# Patient Record
Sex: MALE | Race: WHITE | Employment: OTHER | URBAN - METROPOLITAN AREA
[De-identification: names, ages, dates, MRNs, and addresses within clinical notes are randomized per-mention and may not be internally consistent; named-entity substitution may affect disease eponyms.]

---

## 2018-03-23 ENCOUNTER — HOSPITAL ENCOUNTER (OUTPATIENT)
Dept: OTHER | Age: 63
Discharge: HOME | End: 2018-03-23

## 2018-03-23 DIAGNOSIS — M41.125 ADOLESCENT IDIOPATHIC SCOLIOSIS OF THORACOLUMBAR REGION: ICD-10-CM

## 2018-04-03 LAB
A/G RATIO: 1.6 (ref 1.1–2.2)
ALBUMIN SERPL-MCNC: 4.5 G/DL (ref 3.4–5)
ALP BLD-CCNC: 50 U/L (ref 40–129)
ALT SERPL-CCNC: 20 U/L (ref 10–40)
ANION GAP SERPL CALCULATED.3IONS-SCNC: 13 MMOL/L (ref 3–16)
AST SERPL-CCNC: 27 U/L (ref 15–37)
BASOPHILS ABSOLUTE: 0 K/UL (ref 0–0.2)
BASOPHILS RELATIVE PERCENT: 0.7 %
BILIRUB SERPL-MCNC: 0.4 MG/DL (ref 0–1)
BUN BLDV-MCNC: 13 MG/DL (ref 7–20)
CALCIUM SERPL-MCNC: 9.1 MG/DL (ref 8.3–10.6)
CHLORIDE BLD-SCNC: 103 MMOL/L (ref 99–110)
CHOLESTEROL, TOTAL: 110 MG/DL (ref 0–199)
CO2: 27 MMOL/L (ref 21–32)
CREAT SERPL-MCNC: 1 MG/DL (ref 0.8–1.3)
EOSINOPHILS ABSOLUTE: 0.1 K/UL (ref 0–0.6)
EOSINOPHILS RELATIVE PERCENT: 2.5 %
FOLATE: >20 NG/ML (ref 4.78–24.2)
GFR AFRICAN AMERICAN: >60
GFR NON-AFRICAN AMERICAN: >60
GLOBULIN: 2.9 G/DL
GLUCOSE BLD-MCNC: 101 MG/DL (ref 70–99)
HCT VFR BLD CALC: 36.3 % (ref 40.5–52.5)
HDLC SERPL-MCNC: 39 MG/DL (ref 40–60)
HEMOGLOBIN: 12.4 G/DL (ref 13.5–17.5)
LDL CHOLESTEROL CALCULATED: 51 MG/DL
LYMPHOCYTES ABSOLUTE: 1.7 K/UL (ref 1–5.1)
LYMPHOCYTES RELATIVE PERCENT: 33.5 %
MCH RBC QN AUTO: 32.1 PG (ref 26–34)
MCHC RBC AUTO-ENTMCNC: 34.1 G/DL (ref 31–36)
MCV RBC AUTO: 94 FL (ref 80–100)
MONOCYTES ABSOLUTE: 0.5 K/UL (ref 0–1.3)
MONOCYTES RELATIVE PERCENT: 9.7 %
NEUTROPHILS ABSOLUTE: 2.6 K/UL (ref 1.7–7.7)
NEUTROPHILS RELATIVE PERCENT: 53.6 %
PDW BLD-RTO: 12.5 % (ref 12.4–15.4)
PLATELET # BLD: 241 K/UL (ref 135–450)
PMV BLD AUTO: 8.8 FL (ref 5–10.5)
POTASSIUM SERPL-SCNC: 4.7 MMOL/L (ref 3.5–5.1)
PROSTATE SPECIFIC ANTIGEN: 0.69 NG/ML (ref 0–4)
RBC # BLD: 3.86 M/UL (ref 4.2–5.9)
SODIUM BLD-SCNC: 143 MMOL/L (ref 136–145)
TOTAL PROTEIN: 7.4 G/DL (ref 6.4–8.2)
TRIGL SERPL-MCNC: 101 MG/DL (ref 0–150)
TSH SERPL DL<=0.05 MIU/L-ACNC: 3.09 UIU/ML (ref 0.27–4.2)
VITAMIN B-12: 766 PG/ML (ref 211–911)
VITAMIN D 25-HYDROXY: 26.2 NG/ML
VLDLC SERPL CALC-MCNC: 20 MG/DL
WBC # BLD: 4.9 K/UL (ref 4–11)

## 2021-02-24 ENCOUNTER — OFFICE VISIT (OUTPATIENT)
Dept: PRIMARY CARE CLINIC | Age: 66
End: 2021-02-24
Payer: MEDICARE

## 2021-02-24 DIAGNOSIS — Z20.828 EXPOSURE TO SARS-ASSOCIATED CORONAVIRUS: Primary | ICD-10-CM

## 2021-02-24 PROCEDURE — 99211 OFF/OP EST MAY X REQ PHY/QHP: CPT | Performed by: NURSE PRACTITIONER

## 2021-02-24 PROCEDURE — G8428 CUR MEDS NOT DOCUMENT: HCPCS | Performed by: NURSE PRACTITIONER

## 2021-02-24 PROCEDURE — G8421 BMI NOT CALCULATED: HCPCS | Performed by: NURSE PRACTITIONER

## 2021-02-24 NOTE — PROGRESS NOTES
Rachel Davidhilario received a viral test for COVID-19. They were educated on isolation and quarantine as appropriate. For any symptoms, they were directed to seek care from their PCP, given contact information to establish with a doctor, directed to an urgent care or the emergency room.

## 2021-02-25 LAB — SARS-COV-2: NOT DETECTED

## 2021-03-02 ENCOUNTER — APPOINTMENT (OUTPATIENT)
Dept: GENERAL RADIOLOGY | Age: 66
End: 2021-03-02
Attending: ANESTHESIOLOGY
Payer: MEDICARE

## 2021-03-02 ENCOUNTER — HOSPITAL ENCOUNTER (OUTPATIENT)
Age: 66
Setting detail: OUTPATIENT SURGERY
Discharge: HOME | End: 2021-03-02
Attending: ANESTHESIOLOGY | Admitting: ANESTHESIOLOGY
Payer: MEDICARE

## 2021-03-02 VITALS
SYSTOLIC BLOOD PRESSURE: 124 MMHG | HEART RATE: 76 BPM | OXYGEN SATURATION: 98 % | BODY MASS INDEX: 28.63 KG/M2 | TEMPERATURE: 98 F | HEIGHT: 70 IN | DIASTOLIC BLOOD PRESSURE: 72 MMHG | WEIGHT: 200 LBS | RESPIRATION RATE: 16 BRPM

## 2021-03-02 PROCEDURE — 2709999900 HC NON-CHARGEABLE SUPPLY: Performed by: ANESTHESIOLOGY

## 2021-03-02 PROCEDURE — 6360000002 HC RX W HCPCS: Performed by: ANESTHESIOLOGY

## 2021-03-02 PROCEDURE — 99152 MOD SED SAME PHYS/QHP 5/>YRS: CPT | Performed by: ANESTHESIOLOGY

## 2021-03-02 PROCEDURE — 77003 FLUOROGUIDE FOR SPINE INJECT: CPT

## 2021-03-02 PROCEDURE — 3610000054 HC PAIN LEVEL 3 BASE (NON-OR): Performed by: ANESTHESIOLOGY

## 2021-03-02 RX ORDER — MIDAZOLAM HYDROCHLORIDE 1 MG/ML
INJECTION INTRAMUSCULAR; INTRAVENOUS
Status: COMPLETED | OUTPATIENT
Start: 2021-03-02 | End: 2021-03-02

## 2021-03-02 RX ORDER — AMLODIPINE BESYLATE 5 MG/1
5 TABLET ORAL DAILY
COMMUNITY

## 2021-03-02 RX ORDER — LEVOTHYROXINE SODIUM 0.1 MG/1
100 TABLET ORAL DAILY
COMMUNITY

## 2021-03-02 RX ORDER — MORPHINE SULFATE 30 MG/1
30 TABLET ORAL 2 TIMES DAILY
COMMUNITY

## 2021-03-02 RX ORDER — METHYLPREDNISOLONE ACETATE 80 MG/ML
INJECTION, SUSPENSION INTRA-ARTICULAR; INTRALESIONAL; INTRAMUSCULAR; SOFT TISSUE
Status: COMPLETED | OUTPATIENT
Start: 2021-03-02 | End: 2021-03-02

## 2021-03-02 RX ORDER — FENTANYL CITRATE 50 UG/ML
INJECTION, SOLUTION INTRAMUSCULAR; INTRAVENOUS
Status: COMPLETED | OUTPATIENT
Start: 2021-03-02 | End: 2021-03-02

## 2021-03-02 ASSESSMENT — PAIN DESCRIPTION - ORIENTATION
ORIENTATION: LOWER
ORIENTATION: LOWER

## 2021-03-02 ASSESSMENT — PAIN DESCRIPTION - LOCATION: LOCATION: BACK

## 2021-03-02 NOTE — PROGRESS NOTES
IV discontinued, catheter intact, and dressing applied. Procedural dressing dry and intact. Bilateral  extremities equal in strength. Discharge instructions reviewed with patient or responsible adult, signed and copy given. All home medications have been reviewed. All questions answered and patient or responsible adult verbalized understanding.   PAIN LEVEL AT DISCHARGE _6____

## 2021-03-03 NOTE — OP NOTE
Operative Note      Patient: Raad Byrne  YOB: 1955  MRN: [id number]    Date of Procedure: 3/2/2021    Pre-Op Diagnosis: RADICULOPATHY LUMBAR REGION (M54.16)    Post-Op Diagnosis: Same       Procedure(s):  L4L5  INTERLAMINAL EPIDURAL STEROID INJECTION WITH FLUOROSCOPY    Surgeon(s):  Daryle Massed, MD    Assistant:   * No surgical staff found *    Anesthesia: IV Sedation    Estimated Blood Loss (mL): Minimal    Complications: None    Specimens:   * No specimens in log *    Implants:  * No implants in log *      Drains: * No LDAs found *    Findings:     Detailed Description of Procedure:   Procedure in Detail:   The patient's chart was reviewed. After obtaining written informed consent, the patient had a 20 g IV placed, and NS was running at 30 ml/hour, the patient was placed in the supine position with the leg slightly flexed. Versed and Fentanyl was given to the patient intravenous, a NC at 4 l was placed and monitors attached. Pre-procedure blood pressure and pulse were stable and recorded in patients clinic chart. MEDICAL NECESSITY: This patient has chronic pain that has failed to respond to conservative measures as outlined in the original history and last physical exam.   The patient's symptoms include Pain and disability of a moderate to severe degree with intermittent refereed pain. The goals of treatment are to 1) achieve optimal pain control, recognizing that a pain-free state may not be achievable; 2) minimize adverse outcomes; 3) enhance functional abilities, and physical and psychological well-being; and 4) enhance the quality of life for patients with chronic pain.   The patient has documented pathology through radiographic imaging, the patient has the same or similar procedure in the past which resulted in significant improvement more than 50% reduction in the pain, as well improvement in their Activity of daily living and quality of life, and this would be the goal for the first time procedure. PROCEDURE:   The patient was placed in the prone position on fluoroscopy table. The lower back was prepped under strict Aseptic Technique with antiseptic solution and draped in the usual sterile fashion. The skin over the L 4- L5 was identified under fluoroscopic guidance and infiltrated with 3 ml of 1% Lidocaine for local anesthesia via 25 gauge needle. An 18-gauge Touhy needle was used under fluoroscopic guidance to access the epidural space, I was not able to get at the same level so I did the L5-S1  using loss of resistance to air technique. Approximately 3 ml of Omnipaque 300 contrast was injected under continuous fluoroscopic guidance and good spread was noted following a negative aspiration  Following negative aspiration, a mixture of 4 ml Preservative free normal Saline and Depomedrol 80 mg = 2 ml was injected with minimal pressure. There was no evidence of CSF, paresthesia or heme. The needle was cleared with preservative free local anesthetic and removed. Skin was cleaned and a sterile dressing was applied. Following the procedure the patient's vital signs were stable. The patient was discharged home in good condition after being given discharge instructions.     Electronically signed by Christinia Collet, MD on 3/3/2021 at 11:25 AM

## 2021-03-03 NOTE — H&P
Update History & Physical    The patient's History and Physical of February 10,  was reviewed with the patient and I examined the patient. There was no change. The surgical site was confirmed by the patient and me. Plan: The risks, benefits, expected outcome, and alternative to the recommended procedure have been discussed with the patient. Patient understands and wants to proceed with the procedure.      Electronically signed by Judd Garcia MD on 3/3/2021 at 11:27 AM

## 2021-07-07 NOTE — PROGRESS NOTES
PATIENT REACHED   YES____NO__X__    PREOP INSTRUCTIONS LEFT ON VM [id number]__[phone]_____________      DATE__7/13/21_______ TIME_1130________ARRIVAL_1030_______PLACE__masc__________  NOTHING TO EAT OR DRINK  AFTER MIDNIGHT THE EVENING PRIOR OR AS INSTRUCTED BY YOUR DR.  Coleen Caldera NEED A RESPONSIBLE ADULT AGE 25 OR OLDER TO DRIVE YOU HOME  PLEASE BRING INSURANCE CARD. PICTURE ID AND COMPLETE LIST OF MEDS  WEAR LOOSE COMFORTABLE CLOTHING  FOLLOW ANY INSTRUCTIONS YOUR DRS OFFICE HAS GIVEN YOU,INCLUDING WHAT MEDICATIONS TO TAKE THE AM OF PROCEDURE AND WHEN AND IF YOU NEED TO STOP ANY BLOOD THINNERS. IF YOU HAVE QUESTIONS REGARDING THIS CALL THE OFFICE  THE GOAL BLOOD SUGAR THE AM OF PROCEDURE  OR LESS ABOVE THAT THE PROCEDURE MAY BE CANCELLED  ANY QUESTIONS CALL YOUR DOCTOR. ALSO,PLEASE READ THE INSTRUCTION PACKET FROM YOUR DR IF YOU RECEIVED ONE. SPINE INTERVENTION NUMBER [phone]      OTHER___________________________________      VISITOR POLICY(subject to change)      There is a one visitor policy at [facility] for all surgeries and endoscopies. Whether the visitor can stay or will be asked to wait in the car will depend on the current policy and if social distancing can be maintained. The policy is subject to change at any time. Please make sure the visitor has a cell phone that is on,charged and able to accept calls, as this may be the way that the staff communicates with them. Pain management is NO VISITOR policyThe patients ride is expected to remain in the car with a cell phone for communication. If the ride is leaving the hospital grounds please make sure they are back in time for pickup. Have the patient inform the staff on arrival what their rides plans are while the patient is in the facility. At the MAIN there is one visitor allowed. Please note that the visitor policy is subject to change.

## 2021-07-13 ENCOUNTER — HOSPITAL ENCOUNTER (OUTPATIENT)
Age: 66
Setting detail: OUTPATIENT SURGERY
Discharge: HOME | End: 2021-07-13
Attending: ANESTHESIOLOGY | Admitting: ANESTHESIOLOGY
Payer: MEDICARE

## 2021-07-13 ENCOUNTER — APPOINTMENT (OUTPATIENT)
Dept: GENERAL RADIOLOGY | Age: 66
End: 2021-07-13
Attending: ANESTHESIOLOGY
Payer: MEDICARE

## 2021-07-13 VITALS
OXYGEN SATURATION: 100 % | RESPIRATION RATE: 15 BRPM | BODY MASS INDEX: 29.35 KG/M2 | DIASTOLIC BLOOD PRESSURE: 76 MMHG | WEIGHT: 205 LBS | HEIGHT: 70 IN | TEMPERATURE: 98.1 F | SYSTOLIC BLOOD PRESSURE: 141 MMHG | HEART RATE: 77 BPM

## 2021-07-13 PROCEDURE — 99152 MOD SED SAME PHYS/QHP 5/>YRS: CPT | Performed by: ANESTHESIOLOGY

## 2021-07-13 PROCEDURE — 3610000056 HC PAIN LEVEL 4 BASE (NON-OR): Performed by: ANESTHESIOLOGY

## 2021-07-13 PROCEDURE — 2709999900 HC NON-CHARGEABLE SUPPLY: Performed by: ANESTHESIOLOGY

## 2021-07-13 PROCEDURE — 77003 FLUOROGUIDE FOR SPINE INJECT: CPT

## 2021-07-13 PROCEDURE — 3610000054 HC PAIN LEVEL 3 BASE (NON-OR): Performed by: ANESTHESIOLOGY

## 2021-07-13 PROCEDURE — 6360000002 HC RX W HCPCS: Performed by: ANESTHESIOLOGY

## 2021-07-13 RX ORDER — MIDAZOLAM HYDROCHLORIDE 1 MG/ML
INJECTION INTRAMUSCULAR; INTRAVENOUS
Status: COMPLETED | OUTPATIENT
Start: 2021-07-13 | End: 2021-07-13

## 2021-07-13 RX ORDER — DEXAMETHASONE SODIUM PHOSPHATE 10 MG/ML
INJECTION, SOLUTION INTRAMUSCULAR; INTRAVENOUS
Status: COMPLETED | OUTPATIENT
Start: 2021-07-13 | End: 2021-07-13

## 2021-07-13 RX ORDER — FENTANYL CITRATE 50 UG/ML
INJECTION, SOLUTION INTRAMUSCULAR; INTRAVENOUS
Status: COMPLETED | OUTPATIENT
Start: 2021-07-13 | End: 2021-07-13

## 2021-07-13 ASSESSMENT — PAIN DESCRIPTION - DESCRIPTORS: DESCRIPTORS: BURNING;SHOOTING

## 2021-07-13 ASSESSMENT — PAIN - FUNCTIONAL ASSESSMENT
PAIN_FUNCTIONAL_ASSESSMENT: PREVENTS OR INTERFERES SOME ACTIVE ACTIVITIES AND ADLS
PAIN_FUNCTIONAL_ASSESSMENT: 0-10

## 2021-07-13 ASSESSMENT — PAIN SCALES - GENERAL
PAINLEVEL_OUTOF10: 0
PAINLEVEL_OUTOF10: 0

## 2021-07-13 NOTE — PROGRESS NOTES
IV discontinued, catheter intact, and dressing applied. Procedural dressing dry and intact. Bilateral lower extremities equal in strength. Discharge instructions reviewed with patient, signed and copy given. All home medications have been reviewed. All questions answered and patient verbalized understanding.

## 2021-07-14 NOTE — H&P
Update History & Physical    The patient's History and Physical of July 13,  was reviewed with the patient and I examined the patient. There was no change. The surgical site was confirmed by the patient and me. Plan: The risks, benefits, expected outcome, and alternative to the recommended procedure have been discussed with the patient. Patient understands and wants to proceed with the procedure.      Electronically signed by Alexandra Smith MD on 7/14/2021 at 2:31 PM

## 2021-07-15 NOTE — OP NOTE
Operative Note      Patient: Marilyn Strange  YOB: 1955  MRN: [id number]    Date of Procedure: 7/13/2021    Pre-Op Diagnosis: M54.16 RADICULOPATHY LUMBAR REGION    Post-Op Diagnosis: Same       Procedure(s):  L5-S1 INTERLAMINAR EPIDURAL STEROID INJECTION WITH FLUOROSCOPY    Surgeon(s):  Mabelene Fleischer, MD    Assistant:   * No surgical staff found *    Anesthesia: IV Sedation    Estimated Blood Loss (mL): Minimal    Complications: None    Specimens:   * No specimens in log *    Implants:  * No implants in log *      Drains: * No LDAs found *    Findings:     Detailed Description of Procedure:   Procedure in Detail:   The patient's chart was reviewed. After obtaining written informed consent, the patient had a 20 g IV placed, and NS was running at 30 ml/hour, the patient was placed in the supine position with the leg slightly flexed. Versed and Fentanyl was given to the patient intravenous, a NC at 4 l was placed and monitors attached. Pre-procedure blood pressure and pulse were stable and recorded in patients clinic chart. MEDICAL NECESSITY: This patient has chronic pain that has failed to respond to conservative measures as outlined in the original history and last physical exam.   The patient's symptoms include Pain and disability of a moderate to severe degree with intermittent refereed pain. The goals of treatment are to 1) achieve optimal pain control, recognizing that a pain-free state may not be achievable; 2) minimize adverse outcomes; 3) enhance functional abilities, and physical and psychological well-being; and 4) enhance the quality of life for patients with chronic pain.   The patient has documented pathology through radiographic imaging, the patient has the same or similar procedure in the past which resulted in significant improvement more than 50% reduction in the pain, as well improvement in their Activity of daily living and quality of life, and this would be the goal for the first time procedure. PROCEDURE:   The patient was placed in the prone position on fluoroscopy table. The lower back was prepped with antiseptic solution and draped in the usual sterile fashion. The skin over the L5-S1  was identified under fluoroscopic guidance and infiltrated with 3 ml of 1% Lidocaine for local anesthesia via 25 gauge needle. An 18-gauge Touhy needle, was used under fluoroscopic guidance to access the epidural space using loss of resistance to air technique. Approximately 3 ml of Omnipeque 300 contrast was injected under continuous fluoroscopic guidance and good spread was noted following a negative aspiration  Following negative aspiration, a mixture of 4 ml Preservative free normal Saline and Depomedrol 80 mg = 2 ml was injected with minimal pressure. There was no evidence of CSF, paresthesia or heme. The needle was cleared with preservative free local anesthetic and removed. Skin was cleaned and a sterile dressing was applied. Following the procedure the patient's vital signs were stable. The patient was discharged home in good condition after being given discharge instructions.     Electronically signed by Gina Johnson MD on 7/15/2021 at 2:42 PM

## 2021-09-01 NOTE — PROGRESS NOTES
PATIENT REACHED   YES____NO_x___    PREOP INSTRUCTIONS LEFT ON VM [id number]__[phone]_____________      DATE__9/7/21_______ TIME_1030________ARRIVAL_0930_______PLACE__masc__________  NOTHING TO EAT OR DRINK  AFTER MIDNIGHT THE EVENING PRIOR OR AS INSTRUCTED BY YOUR DR.  Hawa Moder NEED A RESPONSIBLE ADULT AGE 18 OR OLDER TO DRIVE YOU HOME  PLEASE BRING INSURANCE CARD. PICTURE ID AND COMPLETE LIST OF MEDS  WEAR LOOSE COMFORTABLE CLOTHING  FOLLOW ANY INSTRUCTIONS YOUR DRS OFFICE HAS GIVEN YOU,INCLUDING WHAT MEDICATIONS TO TAKE THE AM OF PROCEDURE AND WHEN AND IF YOU NEED TO STOP ANY BLOOD THINNERS. IF YOU HAVE QUESTIONS REGARDING THIS CALL THE OFFICE  THE GOAL BLOOD SUGAR THE AM OF PROCEDURE  OR LESS ABOVE THAT THE PROCEDURE MAY BE CANCELLED  ANY QUESTIONS CALL YOUR DOCTOR. ALSO,PLEASE READ THE INSTRUCTION PACKET FROM YOUR DR IF YOU RECEIVED ONE. SPINE INTERVENTION NUMBER [phone]      OTHER___________________________________      VISITOR POLICY(subject to change)      There is a one visitor policy at [facility] for all surgeries and endoscopies. Whether the visitor can stay or will be asked to wait in the car will depend on the current policy and if social distancing can be maintained. The policy is subject to change at any time. Please make sure the visitor has a cell phone that is on,charged and able to accept calls, as this may be the way that the staff communicates with them. Pain management is NO VISITOR policyThe patients ride is expected to remain in the car with a cell phone for communication. If the ride is leaving the hospital grounds please make sure they are back in time for pickup. Have the patient inform the staff on arrival what their rides plans are while the patient is in the facility. At the MAIN there is one visitor allowed. Please note that the visitor policy is subject to change.

## 2021-09-07 ENCOUNTER — HOSPITAL ENCOUNTER (OUTPATIENT)
Age: 66
Setting detail: OUTPATIENT SURGERY
Discharge: HOME | End: 2021-09-07
Attending: ANESTHESIOLOGY | Admitting: ANESTHESIOLOGY
Payer: MEDICARE

## 2021-09-07 ENCOUNTER — APPOINTMENT (OUTPATIENT)
Dept: GENERAL RADIOLOGY | Age: 66
End: 2021-09-07
Attending: ANESTHESIOLOGY
Payer: MEDICARE

## 2021-09-07 VITALS
DIASTOLIC BLOOD PRESSURE: 74 MMHG | RESPIRATION RATE: 16 BRPM | TEMPERATURE: 97.5 F | SYSTOLIC BLOOD PRESSURE: 138 MMHG | OXYGEN SATURATION: 97 % | HEART RATE: 72 BPM

## 2021-09-07 PROCEDURE — 77003 FLUOROGUIDE FOR SPINE INJECT: CPT

## 2021-09-07 PROCEDURE — 2500000003 HC RX 250 WO HCPCS: Performed by: ANESTHESIOLOGY

## 2021-09-07 PROCEDURE — 6360000002 HC RX W HCPCS: Performed by: ANESTHESIOLOGY

## 2021-09-07 PROCEDURE — 99152 MOD SED SAME PHYS/QHP 5/>YRS: CPT | Performed by: ANESTHESIOLOGY

## 2021-09-07 PROCEDURE — 2709999900 HC NON-CHARGEABLE SUPPLY: Performed by: ANESTHESIOLOGY

## 2021-09-07 PROCEDURE — 3610000054 HC PAIN LEVEL 3 BASE (NON-OR): Performed by: ANESTHESIOLOGY

## 2021-09-07 RX ORDER — MIDAZOLAM HYDROCHLORIDE 1 MG/ML
INJECTION INTRAMUSCULAR; INTRAVENOUS
Status: COMPLETED | OUTPATIENT
Start: 2021-09-07 | End: 2021-09-07

## 2021-09-07 RX ORDER — METHYLPREDNISOLONE ACETATE 80 MG/ML
INJECTION, SUSPENSION INTRA-ARTICULAR; INTRALESIONAL; INTRAMUSCULAR; SOFT TISSUE
Status: COMPLETED | OUTPATIENT
Start: 2021-09-07 | End: 2021-09-07

## 2021-09-07 RX ORDER — LIDOCAINE HYDROCHLORIDE 10 MG/ML
INJECTION, SOLUTION EPIDURAL; INFILTRATION; INTRACAUDAL; PERINEURAL
Status: COMPLETED | OUTPATIENT
Start: 2021-09-07 | End: 2021-09-07

## 2021-09-07 RX ORDER — FENTANYL CITRATE 50 UG/ML
INJECTION, SOLUTION INTRAMUSCULAR; INTRAVENOUS
Status: COMPLETED | OUTPATIENT
Start: 2021-09-07 | End: 2021-09-07

## 2021-09-07 ASSESSMENT — PAIN SCALES - GENERAL: PAINLEVEL_OUTOF10: 2

## 2021-09-07 ASSESSMENT — PAIN DESCRIPTION - PAIN TYPE: TYPE: ACUTE PAIN

## 2021-09-07 ASSESSMENT — PAIN - FUNCTIONAL ASSESSMENT: PAIN_FUNCTIONAL_ASSESSMENT: 0-10

## 2021-09-07 ASSESSMENT — PAIN DESCRIPTION - DESCRIPTORS: DESCRIPTORS: THROBBING

## 2021-09-08 NOTE — OP NOTE
Operative Note      Patient: Suzy Jose  YOB: 1955  MRN: [id number]    Date of Procedure: 9/7/2021    Pre-Op Diagnosis: M54.16  RADICULOPATHY LUMBAR REGION    Post-Op Diagnosis: Same       Procedure(s):  L4L5 EPIDURAL STEROID INJECTION WITH FLUOROSCOPY    Surgeon(s):  Jamal Strange MD    Assistant:   * No surgical staff found *    Anesthesia: IV Sedation    Estimated Blood Loss (mL): Minimal    Complications: None    Specimens:   * No specimens in log *    Implants:  * No implants in log *      Drains: * No LDAs found *    Findings:     Detailed Description of Procedure: The patient's chart was reviewed. After obtaining written informed consent, the patient had a 20 g IV placed, and NS was running at 30 ml/hour, the patient was placed in the supine position with the leg slightly flexed. Versed and Fentanyl was given to the patient intravenous, a NC at 4 l was placed and monitors attached. Pre-procedure blood pressure and pulse were stable and recorded in patients clinic chart. MEDICAL NECESSITY: This patient has chronic pain that has failed to respond to conservative measures as outlined in the original history and last physical exam.   The patient's symptoms include Pain and disability of a moderate to severe degree with intermittent refereed pain. The goals of treatment are to 1) achieve optimal pain control, recognizing that a pain-free state may not be achievable; 2) minimize adverse outcomes; 3) enhance functional abilities, and physical and psychological well-being; and 4) enhance the quality of life for patients with chronic pain. The patient has documented pathology through radiographic imaging, the patient has the same or similar procedure in the past which resulted in significant improvement more than 50% reduction in the pain, as well improvement in their Activity of daily living and quality of life, and this would be the goal for the first time procedure.         PROCEDURE: The patient was placed in the prone position on fluoroscopy table. The lower back was prepped with antiseptic solution and draped in the usual sterile fashion. The skin over the L 4-L5   was identified under fluoroscopic guidance and infiltrated with 3 ml of 1% Lidocaine for local anesthesia via 25 gauge needle. An 18-gauge Touhy needle was used under fluoroscopic guidance to access the epidural space using loss of resistance to air technique. Approximately 3 ml of Omnipeque 300 contrast was injected under continuous fluoroscopic guidance and good spread was noted following a negative aspiration  Following negative aspiration, a mixture of 4 ml Preservative free normal Saline and Depomedrol 80 mg = 2 ml was injected with minimal pressure. There was no evidence of CSF, paresthesia or heme. The needle was cleared with preservative free local anesthetic and removed. Skin was cleaned and a sterile dressing was applied. Following the procedure the patient's vital signs were stable. The patient was discharged home in good condition after being given discharge instructions.     Electronically signed by Ke Garibay MD on 9/8/2021 at 5:34 PM

## 2021-09-08 NOTE — H&P
Update History & Physical    The patient's History and Physical of August 9,  was reviewed with the patient and I examined the patient. There was no change. The surgical site was confirmed by the patient and me. Plan: The risks, benefits, expected outcome, and alternative to the recommended procedure have been discussed with the patient. Patient understands and wants to proceed with the procedure.      Electronically signed by Debbi Webb MD on 9/8/2021 at 5:34 PM

## 2021-11-30 ENCOUNTER — HOSPITAL ENCOUNTER (OUTPATIENT)
Age: 66
Setting detail: OUTPATIENT SURGERY
Discharge: HOME | End: 2021-11-30
Attending: ANESTHESIOLOGY | Admitting: ANESTHESIOLOGY
Payer: MEDICARE

## 2021-11-30 ENCOUNTER — APPOINTMENT (OUTPATIENT)
Dept: GENERAL RADIOLOGY | Age: 66
End: 2021-11-30
Attending: ANESTHESIOLOGY
Payer: MEDICARE

## 2021-11-30 VITALS
RESPIRATION RATE: 16 BRPM | WEIGHT: 215 LBS | DIASTOLIC BLOOD PRESSURE: 72 MMHG | HEART RATE: 77 BPM | BODY MASS INDEX: 30.78 KG/M2 | HEIGHT: 70 IN | OXYGEN SATURATION: 98 % | SYSTOLIC BLOOD PRESSURE: 128 MMHG | TEMPERATURE: 97.6 F

## 2021-11-30 PROCEDURE — 3610000054 HC PAIN LEVEL 3 BASE (NON-OR): Performed by: ANESTHESIOLOGY

## 2021-11-30 PROCEDURE — 3209999900 FLUORO FOR SURGICAL PROCEDURES

## 2021-11-30 PROCEDURE — 6360000002 HC RX W HCPCS: Performed by: ANESTHESIOLOGY

## 2021-11-30 PROCEDURE — 2709999900 HC NON-CHARGEABLE SUPPLY: Performed by: ANESTHESIOLOGY

## 2021-11-30 PROCEDURE — 99152 MOD SED SAME PHYS/QHP 5/>YRS: CPT | Performed by: ANESTHESIOLOGY

## 2021-11-30 RX ORDER — MIDAZOLAM HYDROCHLORIDE 1 MG/ML
INJECTION INTRAMUSCULAR; INTRAVENOUS
Status: COMPLETED | OUTPATIENT
Start: 2021-11-30 | End: 2021-11-30

## 2021-11-30 RX ORDER — METHYLPREDNISOLONE ACETATE 80 MG/ML
INJECTION, SUSPENSION INTRA-ARTICULAR; INTRALESIONAL; INTRAMUSCULAR; SOFT TISSUE
Status: COMPLETED | OUTPATIENT
Start: 2021-11-30 | End: 2021-11-30

## 2021-11-30 RX ORDER — FENTANYL CITRATE 50 UG/ML
INJECTION, SOLUTION INTRAMUSCULAR; INTRAVENOUS
Status: COMPLETED | OUTPATIENT
Start: 2021-11-30 | End: 2021-11-30

## 2021-11-30 ASSESSMENT — PAIN DESCRIPTION - PAIN TYPE: TYPE: CHRONIC PAIN

## 2021-11-30 ASSESSMENT — PAIN DESCRIPTION - ORIENTATION: ORIENTATION: RIGHT;LEFT;LOWER;MID

## 2021-11-30 ASSESSMENT — PAIN DESCRIPTION - ONSET: ONSET: ON-GOING

## 2021-11-30 ASSESSMENT — PAIN DESCRIPTION - LOCATION: LOCATION: BACK;HIP;LEG

## 2021-11-30 ASSESSMENT — PAIN - FUNCTIONAL ASSESSMENT: PAIN_FUNCTIONAL_ASSESSMENT: PREVENTS OR INTERFERES WITH ALL ACTIVE AND SOME PASSIVE ACTIVITIES

## 2021-11-30 ASSESSMENT — PAIN DESCRIPTION - DESCRIPTORS: DESCRIPTORS: ACHING;DULL

## 2021-11-30 ASSESSMENT — PAIN SCALES - GENERAL
PAINLEVEL_OUTOF10: 0
PAINLEVEL_OUTOF10: 0
PAINLEVEL_OUTOF10: 4

## 2021-11-30 ASSESSMENT — PAIN DESCRIPTION - FREQUENCY: FREQUENCY: CONTINUOUS

## 2021-11-30 ASSESSMENT — PAIN DESCRIPTION - PROGRESSION: CLINICAL_PROGRESSION: GRADUALLY WORSENING

## 2021-11-30 NOTE — PROGRESS NOTES
IV discontinued, catheter intact, and dressing applied. Procedural dressing dry and intact. Bilateral lower extremities equal in strength. Discharge instructions reviewed with patient, signed and copy given. All home medications have been reviewed. All questions answered and patient verbalized understanding.   PAIN LEVEL AT DISCHARGE __0___

## 2021-12-01 NOTE — OP NOTE
Operative Note      Patient: Kaz Solano  YOB: 1955  MRN: [id number]    Date of Procedure: 11/30/2021    Pre-Op Diagnosis: M54.16  LUMBAR RADICULOPATHY    Post-Op Diagnosis: Same       Procedure(s):  L4L5 INTERLAMINAR EPIDURAL STEROID INJECTION WITH FLUOROSCOPY    Surgeon(s):  Trenton Bell MD    Assistant:   * No surgical staff found *    Anesthesia: IV Sedation    Estimated Blood Loss (mL): Minimal    Complications: None    Specimens:   * No specimens in log *    Implants:  * No implants in log *      Drains: * No LDAs found *    Findings:     Detailed Description of Procedure:   Procedure in Detail:   The patient's chart was reviewed. After obtaining written informed consent, the patient had a 20 g IV placed, and NS was running at 30 ml/hour, the patient was placed in the supine position with the leg slightly flexed. Versed and Fentanyl was given to the patient intravenous, a NC at 4 l was placed and monitors attached. Pre-procedure blood pressure and pulse were stable and recorded in patients clinic chart. MEDICAL NECESSITY: This patient has chronic pain that has failed to respond to conservative measures as outlined in the original history and last physical exam.   The patient's symptoms include Pain and disability of a moderate to severe degree with intermittent refereed pain. The goals of treatment are to 1) achieve optimal pain control, recognizing that a pain-free state may not be achievable; 2) minimize adverse outcomes; 3) enhance functional abilities, and physical and psychological well-being; and 4) enhance the quality of life for patients with chronic pain.   The patient has documented pathology through radiographic imaging, the patient has the same or similar procedure in the past which resulted in significant improvement more than 50% reduction in the pain, as well improvement in their Activity of daily living and quality of life, and this would be the goal for the first time procedure. PROCEDURE:   The patient was placed in the prone position on fluoroscopy table. The lower back was prepped under strict Aseptic Technique with antiseptic solution and draped in the usual sterile fashion. The skin over the L 4-5 was identified under fluoroscopic guidance and infiltrated with 3 ml of 1% Lidocaine for local anesthesia via 25 gauge needle. An 18-gauge Touhy needle was used under fluoroscopic guidance to access the epidural space using loss of resistance to air technique. Approximately 3 ml of Omnipaque 300 contrast was injected under continuous fluoroscopic guidance and good spread was noted following a negative aspiration  Following negative aspiration, a mixture of 4 ml Preservative free normal Saline and Depomedrol 80 mg = 2 ml was injected with minimal pressure. There was no evidence of CSF, paresthesia or heme. The needle was cleared with preservative free local anesthetic and removed. Skin was cleaned and a sterile dressing was applied. Following the procedure the patient's vital signs were stable. The patient was discharged home in good condition after being given discharge instructions.     Electronically signed by Mj Pereira MD on 12/1/2021 at 1:02 PM

## 2021-12-01 NOTE — H&P
Update History & Physical    The patient's History and Physical of November 11, 2021 was reviewed with the patient and I examined the patient. There was no change. The surgical site was confirmed by the patient and me. Plan: The risks, benefits, expected outcome, and alternative to the recommended procedure have been discussed with the patient. Patient understands and wants to proceed with the procedure.      Electronically signed by Leatha Cerda MD on 12/1/2021 at 1:01 PM

## 2022-02-08 ENCOUNTER — HOSPITAL ENCOUNTER (OUTPATIENT)
Dept: GENERAL RADIOLOGY | Age: 67
Discharge: HOME | End: 2022-02-08
Payer: MEDICARE

## 2022-02-08 ENCOUNTER — HOSPITAL ENCOUNTER (OUTPATIENT)
Age: 67
Discharge: HOME | End: 2022-02-08
Payer: MEDICARE

## 2022-02-08 DIAGNOSIS — M17.12 ARTHRITIS OF KNEE, LEFT: ICD-10-CM

## 2022-02-08 PROCEDURE — 73562 X-RAY EXAM OF KNEE 3: CPT

## 2022-02-22 NOTE — PROGRESS NOTES
PATIENT REACHED   YES_X___NO____    PREOP INSTRUCTIONS LEFT ON VM NUMBER_______________      DATE_3/1/2022________ TIME_1045________ARRIVAL_0945_______[address] [place] [address]___________  NOTHING TO EAT OR DRINK  AFTER MIDNIGHT THE EVENING PRIOR OR AS INSTRUCTED BY YOUR DR.  Priyanka Stroud NEED A RESPONSIBLE ADULT AGE 25 OR OLDER TO DRIVE YOU HOME  PLEASE BRING INSURANCE CARD. PICTURE ID AND COMPLETE LIST OF MEDS  WEAR LOOSE COMFORTABLE CLOTHING  FOLLOW ANY INSTRUCTIONS YOUR DRS OFFICE HAS GIVEN YOU,INCLUDING WHAT MEDICATIONS TO TAKE THE AM OF PROCEDURE AND WHEN AND IF YOU NEED TO STOP ANY BLOOD THINNERS. IF YOU HAVE QUESTIONS REGARDING THIS CALL THE OFFICE  THE GOAL BLOOD SUGAR THE AM OF PROCEDURE  OR LESS ABOVE THAT THE PROCEDURE MAY BE CANCELLED  ANY QUESTIONS CALL YOUR DOCTOR. ALSO,PLEASE READ THE INSTRUCTION PACKET FROM YOUR DR IF YOU RECEIVED ONE. SPINE INTERVENTION NUMBER [phone]      OTHER___________________________________      VISITOR POLICY(subject to change)      There is a one visitor policy at [facility] for all surgeries and endoscopies. Whether the visitor can stay or will be asked to wait in the car will depend on the current policy and if social distancing can be maintained. The policy is subject to change at any time. Please make sure the visitor has a cell phone that is on,charged and able to accept calls, as this may be the way that the staff communicates with them. Pain management is NO VISITOR policyThe patients ride is expected to remain in the car with a cell phone for communication. If the ride is leaving the hospital grounds please make sure they are back in time for pickup. Have the patient inform the staff on arrival what their rides plans are while the patient is in the facility. At the MAIN there is one visitor allowed. Please note that the visitor policy is subject to change.

## 2022-03-01 ENCOUNTER — HOSPITAL ENCOUNTER (OUTPATIENT)
Age: 67
Setting detail: OUTPATIENT SURGERY
Discharge: HOME | End: 2022-03-01
Attending: ANESTHESIOLOGY | Admitting: ANESTHESIOLOGY
Payer: MEDICARE

## 2022-03-01 ENCOUNTER — APPOINTMENT (OUTPATIENT)
Dept: GENERAL RADIOLOGY | Age: 67
End: 2022-03-01
Attending: ANESTHESIOLOGY
Payer: MEDICARE

## 2022-03-01 VITALS
RESPIRATION RATE: 16 BRPM | DIASTOLIC BLOOD PRESSURE: 72 MMHG | HEIGHT: 70 IN | TEMPERATURE: 98.2 F | HEART RATE: 70 BPM | WEIGHT: 225 LBS | BODY MASS INDEX: 32.21 KG/M2 | OXYGEN SATURATION: 98 % | SYSTOLIC BLOOD PRESSURE: 150 MMHG

## 2022-03-01 PROCEDURE — 99152 MOD SED SAME PHYS/QHP 5/>YRS: CPT | Performed by: ANESTHESIOLOGY

## 2022-03-01 PROCEDURE — 3610000054 HC PAIN LEVEL 3 BASE (NON-OR): Performed by: ANESTHESIOLOGY

## 2022-03-01 PROCEDURE — 6360000002 HC RX W HCPCS: Performed by: ANESTHESIOLOGY

## 2022-03-01 PROCEDURE — 2500000003 HC RX 250 WO HCPCS: Performed by: ANESTHESIOLOGY

## 2022-03-01 PROCEDURE — 2709999900 HC NON-CHARGEABLE SUPPLY: Performed by: ANESTHESIOLOGY

## 2022-03-01 PROCEDURE — 77003 FLUOROGUIDE FOR SPINE INJECT: CPT

## 2022-03-01 RX ORDER — MIDAZOLAM HYDROCHLORIDE 1 MG/ML
INJECTION INTRAMUSCULAR; INTRAVENOUS
Status: COMPLETED | OUTPATIENT
Start: 2022-03-01 | End: 2022-03-01

## 2022-03-01 RX ORDER — LIDOCAINE HYDROCHLORIDE 10 MG/ML
INJECTION, SOLUTION EPIDURAL; INFILTRATION; INTRACAUDAL; PERINEURAL
Status: COMPLETED | OUTPATIENT
Start: 2022-03-01 | End: 2022-03-01

## 2022-03-01 RX ORDER — FENTANYL CITRATE 50 UG/ML
INJECTION, SOLUTION INTRAMUSCULAR; INTRAVENOUS
Status: COMPLETED | OUTPATIENT
Start: 2022-03-01 | End: 2022-03-01

## 2022-03-01 RX ORDER — METHYLPREDNISOLONE ACETATE 80 MG/ML
INJECTION, SUSPENSION INTRA-ARTICULAR; INTRALESIONAL; INTRAMUSCULAR; SOFT TISSUE
Status: COMPLETED | OUTPATIENT
Start: 2022-03-01 | End: 2022-03-01

## 2022-03-01 ASSESSMENT — PAIN SCALES - GENERAL: PAINLEVEL_OUTOF10: 0

## 2022-03-01 ASSESSMENT — PAIN DESCRIPTION - DESCRIPTORS: DESCRIPTORS: THROBBING

## 2022-03-01 ASSESSMENT — PAIN - FUNCTIONAL ASSESSMENT: PAIN_FUNCTIONAL_ASSESSMENT: 0-10

## 2022-03-01 ASSESSMENT — PAIN DESCRIPTION - PAIN TYPE: TYPE: CHRONIC PAIN

## 2022-03-01 NOTE — OP NOTE
Operative Note      Patient: Mesha Cheng  YOB: 1955  MRN: [id number]    Date of Procedure: 3/1/2022    Pre-Op Diagnosis: M54.16  RADICULOPATHY LUMBAR REGION    Post-Op Diagnosis: Same       Procedure(s):  L4-L5 INTERLAMINAR EPIDURAL STEROID INJECTION WITH FLUOROSCOPY    Surgeon(s):  Samantha Cervantes MD    Assistant:   * No surgical staff found *    Anesthesia: IV Sedation    Estimated Blood Loss (mL): Minimal    Complications: None    Specimens:   * No specimens in log *    Implants:  * No implants in log *      Drains: * No LDAs found *    Findings:     Detailed Description of Procedure:   Procedure in Detail:   The patient's chart was reviewed. After obtaining written informed consent, the patient had a 20 g IV placed, and NS was running at 30 ml/hour, the patient was placed in the supine position with the leg slightly flexed. Versed and Fentanyl was given to the patient intravenous, a NC at 4 l was placed and monitors attached. Pre-procedure blood pressure and pulse were stable and recorded in patients clinic chart. MEDICAL NECESSITY: This patient has chronic pain that has failed to respond to conservative measures as outlined in the original history and last physical exam.   The patient's symptoms include Pain and disability of a moderate to severe degree with intermittent refereed pain. The goals of treatment are to 1) achieve optimal pain control, recognizing that a pain-free state may not be achievable; 2) minimize adverse outcomes; 3) enhance functional abilities, and physical and psychological well-being; and 4) enhance the quality of life for patients with chronic pain.   The patient has documented pathology through radiographic imaging, the patient has the same or similar procedure in the past which resulted in significant improvement more than 50% reduction in the pain, as well improvement in their Activity of daily living and quality of life, and this would be the goal for the first time procedure. PROCEDURE:   The patient was placed in the prone position on fluoroscopy table. The lower back was prepped with antiseptic solution and draped in the usual sterile fashion. The skin over the L 4-L5   was identified under fluoroscopic guidance and infiltrated with 3 ml of 1% Lidocaine for local anesthesia via 25 gauge needle. An 18-gauge Touhy needle was used under fluoroscopic guidance to access the epidural space using loss of resistance to air technique. Approximately 3 ml of Omnipeque 300 contrast was injected under continuous fluoroscopic guidance and good spread was noted following a negative aspiration  Following negative aspiration, a mixture of 4 ml Preservative free normal Saline and Depomedrol 80 mg = 2 ml was injected with minimal pressure. There was no evidence of CSF, paresthesia or heme. The needle was cleared with preservative free local anesthetic and removed. Skin was cleaned and a sterile dressing was applied. Following the procedure the patient's vital signs were stable. The patient was discharged home in good condition after being given discharge instructions.     Electronically signed by Buck Thorne MD on 3/1/2022 at 5:57 PM

## 2022-03-01 NOTE — PROGRESS NOTES
Brought to phase 2 recovery via chair. Tolerated well resp even and unlabored sats 98% on room air. Pain level0/10 currently. States a little lightheadedness. Does have 2 puncture sites. To the lower back with band aids in place no drainage noted.

## 2022-03-01 NOTE — H&P
Update History & Physical    The patient's History and Physical of February 10, 2022 was reviewed with the patient and I examined the patient. There was no change. The surgical site was confirmed by the patient and me. Plan: The risks, benefits, expected outcome, and alternative to the recommended procedure have been discussed with the patient. Patient understands and wants to proceed with the procedure.      Electronically signed by Buck Thorne MD on 3/1/2022 at 5:56 PM

## 2022-05-03 NOTE — PROGRESS NOTES
PATIENT REACHED   YES____NO_X___    PREOP INSTRUCTIONS LEFT ON  ENQUTS___[phone]____________      EOFG__1-44-49_______ TIME_1100________ARRIVAL_1000_______PLACE_____SIC_______  NOTHING TO EAT OR DRINK  AFTER MIDNIGHT THE EVENING PRIOR OR AS INSTRUCTED BY YOUR DR.  Clau Perez NEED A RESPONSIBLE ADULT AGE 18 OR OLDER TO DRIVE YOU HOME  PLEASE BRING INSURANCE CARD. PICTURE ID AND COMPLETE LIST OF MEDS  WEAR LOOSE COMFORTABLE CLOTHING  FOLLOW ANY INSTRUCTIONS YOUR DRS OFFICE HAS GIVEN YOU,INCLUDING WHAT MEDICATIONS TO TAKE THE AM OF PROCEDURE AND WHEN AND IF YOU NEED TO STOP ANY BLOOD THINNERS. IF YOU HAVE QUESTIONS REGARDING THIS CALL THE OFFICE  THE GOAL BLOOD SUGAR THE AM OF PROCEDURE  OR LESS ABOVE THAT THE PROCEDURE MAY BE CANCELLED  ANY QUESTIONS CALL YOUR DOCTOR. ALSO,PLEASE READ THE INSTRUCTION PACKET FROM YOUR DR IF YOU RECEIVED ONE. SPINE INTERVENTION NUMBER [phone]      OTHER___________________________________      VISITOR POLICY(subject to change)    Current policy is 2 visitors per patient. No children. Masks are required.

## 2022-05-10 ENCOUNTER — APPOINTMENT (OUTPATIENT)
Dept: GENERAL RADIOLOGY | Age: 67
End: 2022-05-10
Attending: ANESTHESIOLOGY
Payer: MEDICARE

## 2022-05-10 ENCOUNTER — HOSPITAL ENCOUNTER (OUTPATIENT)
Age: 67
Setting detail: OUTPATIENT SURGERY
Discharge: HOME | End: 2022-05-10
Attending: ANESTHESIOLOGY | Admitting: ANESTHESIOLOGY
Payer: MEDICARE

## 2022-05-10 VITALS
WEIGHT: 220 LBS | HEART RATE: 73 BPM | HEIGHT: 70 IN | DIASTOLIC BLOOD PRESSURE: 78 MMHG | OXYGEN SATURATION: 98 % | BODY MASS INDEX: 31.5 KG/M2 | SYSTOLIC BLOOD PRESSURE: 148 MMHG | RESPIRATION RATE: 14 BRPM | TEMPERATURE: 97.3 F

## 2022-05-10 PROCEDURE — 99152 MOD SED SAME PHYS/QHP 5/>YRS: CPT | Performed by: ANESTHESIOLOGY

## 2022-05-10 PROCEDURE — 3209999900 FLUORO FOR SURGICAL PROCEDURES

## 2022-05-10 PROCEDURE — 3610000054 HC PAIN LEVEL 3 BASE (NON-OR): Performed by: ANESTHESIOLOGY

## 2022-05-10 PROCEDURE — 2709999900 HC NON-CHARGEABLE SUPPLY: Performed by: ANESTHESIOLOGY

## 2022-05-10 PROCEDURE — 2500000003 HC RX 250 WO HCPCS: Performed by: ANESTHESIOLOGY

## 2022-05-10 PROCEDURE — 6360000002 HC RX W HCPCS: Performed by: ANESTHESIOLOGY

## 2022-05-10 RX ORDER — METHYLPREDNISOLONE ACETATE 80 MG/ML
INJECTION, SUSPENSION INTRA-ARTICULAR; INTRALESIONAL; INTRAMUSCULAR; SOFT TISSUE
Status: COMPLETED | OUTPATIENT
Start: 2022-05-10 | End: 2022-05-10

## 2022-05-10 RX ORDER — IBUPROFEN 200 MG
200 TABLET ORAL EVERY 6 HOURS PRN
COMMUNITY

## 2022-05-10 RX ORDER — FENTANYL CITRATE 50 UG/ML
INJECTION, SOLUTION INTRAMUSCULAR; INTRAVENOUS
Status: COMPLETED | OUTPATIENT
Start: 2022-05-10 | End: 2022-05-10

## 2022-05-10 RX ORDER — MIDAZOLAM HYDROCHLORIDE 1 MG/ML
INJECTION INTRAMUSCULAR; INTRAVENOUS
Status: COMPLETED | OUTPATIENT
Start: 2022-05-10 | End: 2022-05-10

## 2022-05-10 RX ORDER — LIDOCAINE HYDROCHLORIDE 10 MG/ML
INJECTION, SOLUTION EPIDURAL; INFILTRATION; INTRACAUDAL; PERINEURAL
Status: COMPLETED | OUTPATIENT
Start: 2022-05-10 | End: 2022-05-10

## 2022-05-10 RX ORDER — M-VIT,TX,IRON,MINS/CALC/FOLIC 27MG-0.4MG
1 TABLET ORAL DAILY
COMMUNITY

## 2022-05-10 ASSESSMENT — PAIN - FUNCTIONAL ASSESSMENT: PAIN_FUNCTIONAL_ASSESSMENT: 0-10

## 2022-05-10 NOTE — H&P
Update History & Physical    The patient's History and Physical of April 12, 2022 was reviewed with the patient and I examined the patient. There was no change. The surgical site was confirmed by the patient and me. Plan: The risks, benefits, expected outcome, and alternative to the recommended procedure have been discussed with the patient. Patient understands and wants to proceed with the procedure.      Electronically signed by Nichole Moore MD on 5/10/2022 at 5:37 PM

## 2022-05-10 NOTE — OP NOTE
Operative Note      Patient: Michael Dupont  YOB: 1955  MRN: [id number]    Date of Procedure: 5/10/2022    Pre-Op Diagnosis: M54.16  RADICULOPATHY LUMBAR REGION    Post-Op Diagnosis: Same       Procedure(s):  L4-L5 INTERLAMINAR EPIDURAL STEROID INJECTION WITH FLUOROSCOPY    Surgeon(s):  Casandra Casas MD    Assistant:   * No surgical staff found *    Anesthesia: IV Sedation    Estimated Blood Loss (mL): Minimal    Complications: None    Specimens:   * No specimens in log *    Implants:  * No implants in log *      Drains: * No LDAs found *    Findings:     Detailed Description of Procedure: The patient's chart was reviewed. After obtaining written informed consent, the patient had a 20 g IV placed, the patient was placed in the supine position with the leg slightly flexed. Versed and Fentanyl was given to the patient intravenous monitors attached. Pre-procedure blood pressure and pulse were stable and recorded in patients clinic chart. MEDICAL NECESSITY: This patient has chronic pain that has failed to respond to conservative measures as outlined in the original history and last physical exam.   The patient's symptoms include Pain and disability of a moderate to severe degree with intermittent refereed pain. The goals of treatment are to 1) achieve optimal pain control, recognizing that a pain-free state may not be achievable; 2) minimize adverse outcomes; 3) enhance functional abilities, and physical and psychological well-being; and 4) enhance the quality of life for patients with chronic pain. The patient has documented pathology through radiographic imaging, the patient has the same or similar procedure in the past which resulted in significant improvement more than 50% reduction in the pain, as well improvement in their Activity of daily living and quality of life, and this would be the goal for the first time procedure.         PROCEDURE:   The patient was placed in the prone position on fluoroscopy table. The lower back was prepped with antiseptic solution and draped in the usual sterile fashion. The skin over the L 4-L5   was identified under fluoroscopic guidance and infiltrated with 3 ml of 1% Lidocaine for local anesthesia via 25 gauge needle. An 18-gauge Touhy needle was used under fluoroscopic guidance to access the epidural space using loss of resistance to air technique. Approximately 3 ml of Omnipeque 300 contrast was injected under continuous fluoroscopic guidance and good spread was noted following a negative aspiration  Following negative aspiration, a mixture of 4 ml Preservative free normal Saline and Depomedrol 80 mg = 2 ml was injected with minimal pressure. There was no evidence of CSF, paresthesia or heme. The needle was cleared with preservative free local anesthetic and removed. Skin was cleaned and a sterile dressing was applied. Following the procedure the patient's vital signs were stable. The patient was discharged home in good condition after being given discharge instructions.     Electronically signed by Kezia Lentz MD on 5/10/2022 at 5:38 PM

## 2022-07-19 NOTE — PROGRESS NOTES
PATIENT REACHED   YES____NO__x__    PREOP INSTRUCTIONS LEFT ON  YZUDDI_[phone]____      DATE_7/26/22_ TIME_1130_ARRIVAL_1030_PLACE_[address] [id number]_________  NOTHING TO EAT OR DRINK  AFTER MIDNIGHT THE EVENING PRIOR OR AS INSTRUCTED BY YOUR DR.  Arin Bahena NEED A RESPONSIBLE ADULT AGE 18 OR OLDER TO DRIVE YOU HOME  PLEASE BRING INSURANCE CARD. PICTURE ID AND COMPLETE LIST OF MEDS  WEAR LOOSE COMFORTABLE CLOTHING  FOLLOW ANY INSTRUCTIONS YOUR DRS OFFICE HAS GIVEN YOU,INCLUDING WHAT MEDICATIONS TO TAKE THE AM OF PROCEDURE AND WHEN AND IF YOU NEED TO STOP ANY BLOOD THINNERS. IF YOU HAVE QUESTIONS REGARDING THIS CALL THE OFFICE  THE GOAL BLOOD SUGAR THE AM OF PROCEDURE  OR LESS ABOVE THAT THE PROCEDURE MAY BE CANCELLED  ANY QUESTIONS CALL YOUR DOCTOR. ALSO,PLEASE READ THE INSTRUCTION PACKET FROM YOUR DR IF YOU RECEIVED ONE. SPINE INTERVENTION NUMBER [phone]      OTHER___________________________________      VISITOR POLICY(subject to change)    Current policy is 2 visitors per patient. No children. Masks are required.

## 2022-07-26 ENCOUNTER — HOSPITAL ENCOUNTER (OUTPATIENT)
Age: 67
Setting detail: OUTPATIENT SURGERY
Discharge: HOME | End: 2022-07-26
Attending: ANESTHESIOLOGY | Admitting: ANESTHESIOLOGY
Payer: MEDICARE

## 2022-07-26 ENCOUNTER — APPOINTMENT (OUTPATIENT)
Dept: GENERAL RADIOLOGY | Age: 67
End: 2022-07-26
Attending: ANESTHESIOLOGY
Payer: MEDICARE

## 2022-07-26 VITALS
RESPIRATION RATE: 16 BRPM | OXYGEN SATURATION: 98 % | BODY MASS INDEX: 31.5 KG/M2 | WEIGHT: 220 LBS | SYSTOLIC BLOOD PRESSURE: 154 MMHG | HEART RATE: 79 BPM | HEIGHT: 70 IN | TEMPERATURE: 98.4 F | DIASTOLIC BLOOD PRESSURE: 67 MMHG

## 2022-07-26 PROCEDURE — 3610000054 HC PAIN LEVEL 3 BASE (NON-OR): Performed by: ANESTHESIOLOGY

## 2022-07-26 PROCEDURE — 99152 MOD SED SAME PHYS/QHP 5/>YRS: CPT | Performed by: ANESTHESIOLOGY

## 2022-07-26 PROCEDURE — 3610000056 HC PAIN LEVEL 4 BASE (NON-OR): Performed by: ANESTHESIOLOGY

## 2022-07-26 PROCEDURE — 6360000002 HC RX W HCPCS: Performed by: ANESTHESIOLOGY

## 2022-07-26 PROCEDURE — 2709999900 HC NON-CHARGEABLE SUPPLY: Performed by: ANESTHESIOLOGY

## 2022-07-26 PROCEDURE — 77003 FLUOROGUIDE FOR SPINE INJECT: CPT

## 2022-07-26 PROCEDURE — 2500000003 HC RX 250 WO HCPCS: Performed by: ANESTHESIOLOGY

## 2022-07-26 RX ORDER — METHYLPREDNISOLONE ACETATE 80 MG/ML
INJECTION, SUSPENSION INTRA-ARTICULAR; INTRALESIONAL; INTRAMUSCULAR; SOFT TISSUE
Status: COMPLETED | OUTPATIENT
Start: 2022-07-26 | End: 2022-07-26

## 2022-07-26 RX ORDER — FENTANYL CITRATE 50 UG/ML
INJECTION, SOLUTION INTRAMUSCULAR; INTRAVENOUS
Status: COMPLETED | OUTPATIENT
Start: 2022-07-26 | End: 2022-07-26

## 2022-07-26 RX ORDER — LIDOCAINE HYDROCHLORIDE 10 MG/ML
INJECTION, SOLUTION EPIDURAL; INFILTRATION; INTRACAUDAL; PERINEURAL
Status: COMPLETED | OUTPATIENT
Start: 2022-07-26 | End: 2022-07-26

## 2022-07-26 RX ORDER — MIDAZOLAM HYDROCHLORIDE 1 MG/ML
INJECTION INTRAMUSCULAR; INTRAVENOUS
Status: COMPLETED | OUTPATIENT
Start: 2022-07-26 | End: 2022-07-26

## 2022-07-26 ASSESSMENT — PAIN DESCRIPTION - FREQUENCY
FREQUENCY: CONTINUOUS
FREQUENCY: CONTINUOUS

## 2022-07-26 ASSESSMENT — PAIN SCALES - GENERAL
PAINLEVEL_OUTOF10: 4
PAINLEVEL_OUTOF10: 6

## 2022-07-26 ASSESSMENT — PAIN DESCRIPTION - DESCRIPTORS
DESCRIPTORS: PRESSURE
DESCRIPTORS: PRESSURE
DESCRIPTORS: THROBBING

## 2022-07-26 ASSESSMENT — PAIN DESCRIPTION - PAIN TYPE
TYPE: CHRONIC PAIN
TYPE: CHRONIC PAIN

## 2022-07-26 ASSESSMENT — PAIN DESCRIPTION - LOCATION
LOCATION: BUTTOCKS
LOCATION: BUTTOCKS

## 2022-07-26 ASSESSMENT — PAIN DESCRIPTION - ORIENTATION
ORIENTATION: LEFT
ORIENTATION: LEFT

## 2022-07-26 NOTE — PROGRESS NOTES
Patient took ibuprofen on Sunday at 0800. Spoke with Dr. Ana Vernon and updated. May continue with procedure.

## 2022-07-28 NOTE — OP NOTE
Operative Note      Patient: Keiry Whyte  YOB: 1955  MRN: [id number]    Date of Procedure: 7/26/2022    Pre-Op Diagnosis: Lumbar radiculopathy [M54.16]    Post-Op Diagnosis: Same       Procedure(s):  L4-L5 LUMBAR EPIDURAL STEROID INJECTION WITH FLUOROSCOPY    Surgeon(s):  Rhonda Perez MD    Assistant:   * No surgical staff found *    Anesthesia: IV Sedation    Estimated Blood Loss (mL): Minimal    Complications: None    Specimens:   * No specimens in log *    Implants:  * No implants in log *      Drains: * No LDAs found *    Findings:     Detailed Description of Procedure:   PROCEDURE:  Lumbar epidural steroid injection L 4-L5 with Epidurogram, under Fluoroscopy. Procedure in Detail:   The patient's chart was reviewed. After obtaining written informed consent, the patient had a 20 g IV placed, the patient was placed in the Prone position with the leg slightly flexed. Versed and Fentanyl was given to the patient intravenous, monitors attached. Pre-procedure blood pressure and pulse were stable and recorded in patients clinic chart. MEDICAL NECESSITY: This patient has chronic pain that has failed to respond to conservative measures as outlined in the original history and last physical exam.   The patient's symptoms include Pain and disability of a moderate to severe degree with intermittent refereed pain. The goals of treatment are to 1) achieve optimal pain control, recognizing that a pain-free state may not be achievable; 2) minimize adverse outcomes; 3) enhance functional abilities, and physical and psychological well-being; and 4) enhance the quality of life for patients with chronic pain.   The patient has documented pathology through radiographic imaging, the patient has the same or similar procedure in the past which resulted in significant improvement more than 50% reduction in the pain, as well improvement in their Activity of daily living and quality of life, and this would be the goal for the first time procedure. PROCEDURE:   The patient was placed in the prone position on fluoroscopy table. The lower back was prepped under strict Aseptic Technique with antiseptic solution and draped in the usual sterile fashion. The skin over the L 4-5 was identified under fluoroscopic guidance and infiltrated with 3 ml of 1% Lidocaine for local anesthesia via 25 gauge needle. An 18-gauge Touhy needle was used under fluoroscopic guidance to access the epidural space using loss of resistance to air technique. Approximately 3 ml of Omnipaque 300 contrast was injected under continuous fluoroscopic guidance and good spread was noted following a negative aspiration  Following negative aspiration, a mixture of 4 ml Preservative free normal Saline and Depomedrol 80 mg = 2 ml was injected with minimal pressure. There was no evidence of CSF, paresthesia or heme. The needle was cleared with preservative free local anesthetic and removed. Skin was cleaned and a sterile dressing was applied. Following the procedure the patient's vital signs were stable. The patient was discharged home in good condition after being given discharge instructions.       Electronically signed by Rosa Whitmore MD on 7/28/2022 at 10:29 AM

## 2022-07-28 NOTE — H&P
Update History & Physical    The patient's History and Physical of July 11, 2022 was reviewed with the patient and I examined the patient. There was no change. The surgical site was confirmed by the patient and me. Plan: The risks, benefits, expected outcome, and alternative to the recommended procedure have been discussed with the patient. Patient understands and wants to proceed with the procedure.      Electronically signed by Genna Hess MD on 7/28/2022 at 10:28 AM

## 2022-10-13 NOTE — PROGRESS NOTES
PATIENT REACHED   YES____NO_X___    PREOP INSTRUCTIONS LEFT ON  JGOGEB_[phone]______________      YPLL_70-25-83________ TIME_1015________ARRIVAL_0915_______PLACE_[address]___________  NOTHING TO EAT OR DRINK  AFTER MIDNIGHT THE EVENING PRIOR OR AS INSTRUCTED BY YOUR DR.  [facility] NEED A RESPONSIBLE ADULT AGE 18 OR OLDER TO DRIVE YOU HOME  PLEASE BRING INSURANCE CARD. PICTURE ID AND COMPLETE LIST OF MEDS  WEAR LOOSE COMFORTABLE CLOTHING  FOLLOW ANY INSTRUCTIONS YOUR DRS OFFICE HAS GIVEN YOU,INCLUDING WHAT MEDICATIONS TO TAKE THE AM OF PROCEDURE AND WHEN AND IF YOU NEED TO STOP ANY BLOOD THINNERS. IF YOU HAVE QUESTIONS REGARDING THIS CALL THE OFFICE  THE GOAL BLOOD SUGAR THE AM OF PROCEDURE  OR LESS ABOVE THAT THE PROCEDURE MAY BE CANCELLED  ANY QUESTIONS CALL YOUR DOCTOR. ALSO,PLEASE READ THE INSTRUCTION PACKET FROM YOUR DR IF YOU RECEIVED ONE. SPINE INTERVENTION NUMBER [phone]      OTHER___________________________________      VISITOR POLICY(subject to change)    Current policy is 2 visitors per patient. No children. Masks are required.

## 2022-10-18 ENCOUNTER — APPOINTMENT (OUTPATIENT)
Dept: GENERAL RADIOLOGY | Age: 67
End: 2022-10-18
Attending: ANESTHESIOLOGY
Payer: MEDICARE

## 2022-10-18 ENCOUNTER — HOSPITAL ENCOUNTER (OUTPATIENT)
Age: 67
Setting detail: OUTPATIENT SURGERY
Discharge: HOME | End: 2022-10-18
Attending: ANESTHESIOLOGY | Admitting: ANESTHESIOLOGY
Payer: MEDICARE

## 2022-10-18 VITALS
DIASTOLIC BLOOD PRESSURE: 85 MMHG | SYSTOLIC BLOOD PRESSURE: 151 MMHG | RESPIRATION RATE: 16 BRPM | OXYGEN SATURATION: 98 % | TEMPERATURE: 96.9 F | WEIGHT: 225 LBS | HEIGHT: 70 IN | HEART RATE: 79 BPM | BODY MASS INDEX: 32.21 KG/M2

## 2022-10-18 PROCEDURE — 77003 FLUOROGUIDE FOR SPINE INJECT: CPT

## 2022-10-18 PROCEDURE — 6360000004 HC RX CONTRAST MEDICATION

## 2022-10-18 PROCEDURE — 2709999900 HC NON-CHARGEABLE SUPPLY: Performed by: ANESTHESIOLOGY

## 2022-10-18 PROCEDURE — 2500000003 HC RX 250 WO HCPCS: Performed by: ANESTHESIOLOGY

## 2022-10-18 PROCEDURE — 3610000054 HC PAIN LEVEL 3 BASE (NON-OR): Performed by: ANESTHESIOLOGY

## 2022-10-18 PROCEDURE — 99152 MOD SED SAME PHYS/QHP 5/>YRS: CPT | Performed by: ANESTHESIOLOGY

## 2022-10-18 PROCEDURE — 6360000002 HC RX W HCPCS: Performed by: ANESTHESIOLOGY

## 2022-10-18 RX ORDER — MIDAZOLAM HYDROCHLORIDE 1 MG/ML
INJECTION INTRAMUSCULAR; INTRAVENOUS
Status: COMPLETED | OUTPATIENT
Start: 2022-10-18 | End: 2022-10-18

## 2022-10-18 RX ORDER — METHYLPREDNISOLONE ACETATE 80 MG/ML
INJECTION, SUSPENSION INTRA-ARTICULAR; INTRALESIONAL; INTRAMUSCULAR; SOFT TISSUE
Status: COMPLETED | OUTPATIENT
Start: 2022-10-18 | End: 2022-10-18

## 2022-10-18 RX ORDER — LIDOCAINE HYDROCHLORIDE 10 MG/ML
INJECTION, SOLUTION EPIDURAL; INFILTRATION; INTRACAUDAL; PERINEURAL
Status: COMPLETED | OUTPATIENT
Start: 2022-10-18 | End: 2022-10-18

## 2022-10-18 RX ORDER — FENTANYL CITRATE 50 UG/ML
INJECTION, SOLUTION INTRAMUSCULAR; INTRAVENOUS
Status: COMPLETED | OUTPATIENT
Start: 2022-10-18 | End: 2022-10-18

## 2022-10-18 ASSESSMENT — PAIN DESCRIPTION - DESCRIPTORS: DESCRIPTORS: SQUEEZING

## 2022-10-18 ASSESSMENT — PAIN - FUNCTIONAL ASSESSMENT
PAIN_FUNCTIONAL_ASSESSMENT: 0-10
PAIN_FUNCTIONAL_ASSESSMENT: PREVENTS OR INTERFERES SOME ACTIVE ACTIVITIES AND ADLS

## 2022-10-18 NOTE — DISCHARGE INSTRUCTIONS
1. Keep injection /surgical site dry until the band-aid/ dressing is removed. Please remove band-aids from the injection site 12-24 hours after the procedure. 2. If the injection site is sore, you may apply an ice pack to that area for twenty minutes every two hours for the initial twenty four hours. Do not use heat. 3. Occasionally you may notice slight increase in pain after the procedure. This should start to improve  within the next twenty four to forty eight hours. 4. Remember, it may take as long as forty eight hours before you notice a gradual improvement in your pain and other symptoms. 5. You may continue to take your pain medication as needed. 6. DO NOT stop any other previously prescribed medications. You may resume blood thinning medications the day after the procedure. 7. In general, you should avoid any prolonged standing, walking, or repeated bending or heavy use of your upper extremities for twenty four to forty eight hours but may resume light      Normal activity when you leave the facility     8. If you are currently going to physical therapy, continue to do so unless your doctor instructs you not to.    9. If you develop any other symptoms such fever, rash, unusual drainage from the site, severe headache or weakness please call [phone]. 10. Other: If you had sedation ; Do not drink alcohol or sign any legal or financial contracts for 24 hours. Also do not drive or        Or operate other types of machinery for 24 hours. You should have a responsible adult available to assist you for the rest of the day. 11. Side effects of steroids can include: (these may last a few days then will go away on their own)           - facial flushing           - hot flashes           - nervous energy and difficulty sleeping at night           - muscle spasm or twitching           - fluid retention            - elevated blood sugar levels in diabetics      12.  Please follow up with the provider whom referred you.

## 2022-10-20 NOTE — OP NOTE
Operative Note      Patient: Bassem Matthews  YOB: 1955  MRN: [id number]    Date of Procedure: 10/18/2022    Pre-Op Diagnosis: Degeneration, intervertebral disc, lumbar [M51.36]    Post-Op Diagnosis: Same       Procedure(s):  L4-L5 EPIDURAL STEROID INJECTION WITH FLUOROSCOPY    Surgeon(s):  Rex Larson MD    Assistant:   * No surgical staff found *    Anesthesia: IV Sedation    Estimated Blood Loss (mL): Minimal    Complications: None    Specimens:   * No specimens in log *    Implants:  * No implants in log *      Drains: * No LDAs found *    Findings:     Detailed Description of Procedure:   PROCEDURE:  Lumbar epidural steroid injection L 4-L5 with Epidurogram, under Fluoroscopy. Procedure in Detail:   The patient's chart was reviewed. After obtaining written informed consent, the patient had a 20 g IV placed, the patient was placed in the prone position with the leg slightly flexed. Versed and Fentanyl was given to the patient intravenous, monitors attached. Pre-procedure blood pressure and pulse were stable and recorded in patients clinic chart. MEDICAL NECESSITY: This patient has chronic pain that has failed to respond to conservative measures as outlined in the original history and last physical exam.   The patient's symptoms include Pain and disability of a moderate to severe degree with intermittent refereed pain. The goals of treatment are to 1) achieve optimal pain control, recognizing that a pain-free state may not be achievable; 2) minimize adverse outcomes; 3) enhance functional abilities, and physical and psychological well-being; and 4) enhance the quality of life for patients with chronic pain.   The patient has documented pathology through radiographic imaging, the patient has the same or similar procedure in the past which resulted in significant improvement more than 50% reduction in the pain, as well improvement in their Activity of daily living and quality of life, and this would be the goal for the first time procedure. PROCEDURE:   The patient was placed in the prone position on fluoroscopy table. The lower back was prepped under strict Aseptic Technique with antiseptic solution and draped in the usual sterile fashion. The skin over the L 4- L5 was identified under fluoroscopic guidance and infiltrated with 3 ml of 1% Lidocaine for local anesthesia via 25 gauge needle. An 18-gauge Touhy needle was used under fluoroscopic guidance to access the epidural space using loss of resistance to air technique. Approximately 3 ml of Omnipaque 300 contrast was injected under continuous fluoroscopic guidance and good spread was noted following a negative aspiration  Following negative aspiration, a mixture of 4 ml Preservative free normal Saline and Depomedrol 80 mg = 2 ml was injected with minimal pressure. There was no evidence of CSF, paresthesia or heme. The needle was cleared with preservative free local anesthetic and removed. Skin was cleaned and a sterile dressing was applied. Following the procedure the patient's vital signs were stable. The patient was discharged home in good condition after being given discharge instructions.     Electronically signed by Shaila Ventura MD on 10/20/2022 at 3:23 PM

## 2022-10-20 NOTE — H&P
Update History & Physical    The patient's History and Physical of November 10, 2022 was reviewed with the patient and I examined the patient. There was no change. The surgical site was confirmed by the patient and me. Plan: The risks, benefits, expected outcome, and alternative to the recommended procedure have been discussed with the patient. Patient understands and wants to proceed with the procedure.      Electronically signed by Portillo Wooten MD on 10/20/2022 at 3:23 PM

## 2023-03-23 NOTE — PROGRESS NOTES
PATIENT REACHED   YES____NO__X__    PREOP INSTRUCTIONS LEFT ON VM NUMBER_______________      RWJQ__[id number]_______ TIME__1130_______ARRIVAL__1030______PLACE__[address] [facility]__________  NOTHING TO EAT OR DRINK  AFTER MIDNIGHT THE EVENING PRIOR OR AS INSTRUCTED BY YOUR DR.  Nicola Mckeon NEED A RESPONSIBLE ADULT AGE 18 OR OLDER TO DRIVE YOU HOME  PLEASE BRING INSURANCE CARD. PICTURE ID AND COMPLETE LIST OF MEDS  WEAR LOOSE COMFORTABLE CLOTHING  FOLLOW ANY INSTRUCTIONS YOUR DRS OFFICE HAS GIVEN YOU,INCLUDING WHAT MEDICATIONS TO TAKE THE AM OF PROCEDURE AND WHEN AND IF YOU NEED TO STOP ANY BLOOD THINNERS. IF YOU HAVE QUESTIONS REGARDING THIS CALL THE OFFICE  THE GOAL BLOOD SUGAR THE AM OF PROCEDURE  OR LESS ABOVE THAT THE PROCEDURE MAY BE CANCELLED  ANY QUESTIONS CALL YOUR DOCTOR. ALSO,PLEASE READ THE INSTRUCTION PACKET FROM YOUR DR IF YOU RECEIVED ONE. SPINE INTERVENTION NUMBER [phone]      OTHER___________________________________      VISITOR POLICY(subject to change)    Current policy is 2 visitors per patient. No children. Masks are required.

## 2023-03-28 ENCOUNTER — HOSPITAL ENCOUNTER (OUTPATIENT)
Age: 68
Setting detail: OUTPATIENT SURGERY
Discharge: HOME | End: 2023-03-28
Attending: ANESTHESIOLOGY | Admitting: ANESTHESIOLOGY
Payer: MEDICARE

## 2023-03-28 ENCOUNTER — APPOINTMENT (OUTPATIENT)
Dept: GENERAL RADIOLOGY | Age: 68
End: 2023-03-28
Attending: ANESTHESIOLOGY
Payer: MEDICARE

## 2023-03-28 VITALS
SYSTOLIC BLOOD PRESSURE: 148 MMHG | HEIGHT: 70 IN | DIASTOLIC BLOOD PRESSURE: 77 MMHG | WEIGHT: 225 LBS | OXYGEN SATURATION: 99 % | BODY MASS INDEX: 32.21 KG/M2 | HEART RATE: 73 BPM | TEMPERATURE: 97.5 F | RESPIRATION RATE: 16 BRPM

## 2023-03-28 PROCEDURE — 3610000054 HC PAIN LEVEL 3 BASE (NON-OR): Performed by: ANESTHESIOLOGY

## 2023-03-28 PROCEDURE — 77003 FLUOROGUIDE FOR SPINE INJECT: CPT

## 2023-03-28 PROCEDURE — 2709999900 HC NON-CHARGEABLE SUPPLY: Performed by: ANESTHESIOLOGY

## 2023-03-28 PROCEDURE — 6360000002 HC RX W HCPCS: Performed by: ANESTHESIOLOGY

## 2023-03-28 PROCEDURE — 99152 MOD SED SAME PHYS/QHP 5/>YRS: CPT | Performed by: ANESTHESIOLOGY

## 2023-03-28 PROCEDURE — 2500000003 HC RX 250 WO HCPCS: Performed by: ANESTHESIOLOGY

## 2023-03-28 RX ORDER — METHYLPREDNISOLONE ACETATE 80 MG/ML
INJECTION, SUSPENSION INTRA-ARTICULAR; INTRALESIONAL; INTRAMUSCULAR; SOFT TISSUE
Status: COMPLETED | OUTPATIENT
Start: 2023-03-28 | End: 2023-03-28

## 2023-03-28 RX ORDER — MIDAZOLAM HYDROCHLORIDE 1 MG/ML
INJECTION INTRAMUSCULAR; INTRAVENOUS
Status: COMPLETED | OUTPATIENT
Start: 2023-03-28 | End: 2023-03-28

## 2023-03-28 RX ORDER — LIDOCAINE HYDROCHLORIDE 10 MG/ML
INJECTION, SOLUTION EPIDURAL; INFILTRATION; INTRACAUDAL; PERINEURAL
Status: COMPLETED | OUTPATIENT
Start: 2023-03-28 | End: 2023-03-28

## 2023-03-28 RX ORDER — FENTANYL CITRATE 50 UG/ML
INJECTION, SOLUTION INTRAMUSCULAR; INTRAVENOUS
Status: COMPLETED | OUTPATIENT
Start: 2023-03-28 | End: 2023-03-28

## 2023-03-28 ASSESSMENT — PAIN SCALES - GENERAL
PAINLEVEL_OUTOF10: 0
PAINLEVEL_OUTOF10: 0

## 2023-03-28 ASSESSMENT — PAIN DESCRIPTION - DESCRIPTORS: DESCRIPTORS: STABBING

## 2023-03-28 NOTE — BRIEF OP NOTE
Brief Postoperative Note      Patient: Gloria Adam  YOB: 1955  MRN: [id number]    Date of Procedure: 3/28/2023    Pre-Op Diagnosis: Lumbar radiculopathy [M54.16]    Post-Op Diagnosis: Same       Procedure(s):  L4-L5 INTERLAMINAR EPIDURAL STEROID INJECTION WITH FLUOROSCOPY    Surgeon(s):  Mono Cook MD    Assistant:  * No surgical staff found *    Anesthesia: IV Sedation    Estimated Blood Loss (mL): Minimal    Complications: None    Specimens:   * No specimens in log *    Implants:  * No implants in log *      Drains: * No LDAs found *    Findings: See op note     Electronically signed by Edd Rao MD on 3/28/2023 at 10:41 AM

## 2023-03-28 NOTE — H&P
Update History & Physical    The patient's History and Physical of March 2, 2023 was reviewed with the patient and I examined the patient. There was no change. The surgical site was confirmed by the patient and me. Plan: The risks, benefits, expected outcome, and alternative to the recommended procedure have been discussed with the patient. Patient understands and wants to proceed with the procedure.      Electronically signed by Sheryle Finder, MD on 3/28/2023 at 10:32 AM

## 2023-03-30 NOTE — OP NOTE
Operative Note      Patient: Мария Hassan  YOB: 1955  MRN: [id number]    Date of Procedure: 3/28/2023    Pre-Op Diagnosis: Lumbar radiculopathy [M54.16]    Post-Op Diagnosis: Same       Procedure(s):  L4-L5 INTERLAMINAR EPIDURAL STEROID INJECTION WITH FLUOROSCOPY    Surgeon(s):  Maximo Waite MD    Assistant:   * No surgical staff found *    Anesthesia: IV Sedation    Estimated Blood Loss (mL): Minimal    Complications: None    Specimens:   * No specimens in log *    Implants:  * No implants in log *      Drains: * No LDAs found *    Findings:     Detailed Description of Procedure: The patient's chart was reviewed. After obtaining written informed consent, the patient had a 20 g IV placed, the patient was placed in the prone position with the leg slightly flexed. Versed and Fentanyl was given to the patient intravenous, monitors attached. Pre-procedure blood pressure and pulse were stable and recorded in patients clinic chart. MEDICAL NECESSITY: This patient has chronic pain that has failed to respond to conservative measures as outlined in the original history and last physical exam.   The patient's symptoms include Pain and disability of a moderate to severe degree with intermittent refereed pain. The goals of treatment are to 1) achieve optimal pain control, recognizing that a pain-free state may not be achievable; 2) minimize adverse outcomes; 3) enhance functional abilities, and physical and psychological well-being; and 4) enhance the quality of life for patients with chronic pain. The patient has documented pathology through radiographic imaging, the patient has the same or similar procedure in the past which resulted in significant improvement more than 50% reduction in the pain, as well improvement in their Activity of daily living and quality of life, and this would be the goal for the first time procedure.               PROCEDURE:   The patient was placed in the prone position on

## 2023-06-27 ENCOUNTER — HOSPITAL ENCOUNTER (OUTPATIENT)
Age: 68
Discharge: HOME | End: 2023-06-27
Payer: MEDICARE

## 2023-06-27 ENCOUNTER — HOSPITAL ENCOUNTER (OUTPATIENT)
Dept: GENERAL RADIOLOGY | Age: 68
Discharge: HOME | End: 2023-06-27
Payer: MEDICARE

## 2023-06-27 DIAGNOSIS — M25.561 RIGHT KNEE PAIN, UNSPECIFIED CHRONICITY: ICD-10-CM

## 2023-06-27 PROCEDURE — 73560 X-RAY EXAM OF KNEE 1 OR 2: CPT

## 2023-07-11 ENCOUNTER — APPOINTMENT (OUTPATIENT)
Dept: GENERAL RADIOLOGY | Age: 68
End: 2023-07-11
Attending: ANESTHESIOLOGY
Payer: MEDICARE

## 2023-07-11 ENCOUNTER — HOSPITAL ENCOUNTER (OUTPATIENT)
Age: 68
Setting detail: OUTPATIENT SURGERY
Discharge: HOME | End: 2023-07-11
Attending: ANESTHESIOLOGY | Admitting: ANESTHESIOLOGY
Payer: MEDICARE

## 2023-07-11 VITALS
OXYGEN SATURATION: 98 % | HEART RATE: 73 BPM | TEMPERATURE: 97.3 F | DIASTOLIC BLOOD PRESSURE: 74 MMHG | WEIGHT: 225 LBS | BODY MASS INDEX: 32.21 KG/M2 | HEIGHT: 70 IN | RESPIRATION RATE: 16 BRPM | SYSTOLIC BLOOD PRESSURE: 122 MMHG

## 2023-07-11 PROCEDURE — 99152 MOD SED SAME PHYS/QHP 5/>YRS: CPT | Performed by: ANESTHESIOLOGY

## 2023-07-11 PROCEDURE — 6360000002 HC RX W HCPCS: Performed by: ANESTHESIOLOGY

## 2023-07-11 PROCEDURE — 2500000003 HC RX 250 WO HCPCS: Performed by: ANESTHESIOLOGY

## 2023-07-11 PROCEDURE — 3610000054 HC PAIN LEVEL 3 BASE (NON-OR): Performed by: ANESTHESIOLOGY

## 2023-07-11 PROCEDURE — 2709999900 HC NON-CHARGEABLE SUPPLY: Performed by: ANESTHESIOLOGY

## 2023-07-11 PROCEDURE — 77003 FLUOROGUIDE FOR SPINE INJECT: CPT

## 2023-07-11 RX ORDER — MIDAZOLAM HYDROCHLORIDE 1 MG/ML
INJECTION INTRAMUSCULAR; INTRAVENOUS
Status: COMPLETED | OUTPATIENT
Start: 2023-07-11 | End: 2023-07-11

## 2023-07-11 RX ORDER — METHYLPREDNISOLONE ACETATE 80 MG/ML
INJECTION, SUSPENSION INTRA-ARTICULAR; INTRALESIONAL; INTRAMUSCULAR; SOFT TISSUE
Status: COMPLETED | OUTPATIENT
Start: 2023-07-11 | End: 2023-07-11

## 2023-07-11 RX ORDER — FENTANYL CITRATE 50 UG/ML
INJECTION, SOLUTION INTRAMUSCULAR; INTRAVENOUS
Status: COMPLETED | OUTPATIENT
Start: 2023-07-11 | End: 2023-07-11

## 2023-07-11 RX ORDER — LIDOCAINE HYDROCHLORIDE 10 MG/ML
INJECTION, SOLUTION EPIDURAL; INFILTRATION; INTRACAUDAL; PERINEURAL
Status: COMPLETED | OUTPATIENT
Start: 2023-07-11 | End: 2023-07-11

## 2023-07-11 ASSESSMENT — PAIN SCALES - GENERAL
PAINLEVEL_OUTOF10: 0
PAINLEVEL_OUTOF10: 0

## 2023-07-11 ASSESSMENT — PAIN DESCRIPTION - DESCRIPTORS: DESCRIPTORS: STABBING

## 2023-07-11 NOTE — DISCHARGE INSTRUCTIONS
1. Keep injection /surgical site dry until the band-aid/ dressing is removed. Please remove band-aids from the injection site 12-24 hours after the procedure. 2. If the injection site is sore, you may apply an ice pack to that area for twenty minutes every two hours for the initial twenty four hours. Do not use heat. 3. Occasionally you may notice slight increase in pain after the procedure. This should start to improve  within the next twenty four to forty eight hours. 4. Remember, it may take as long as forty eight hours before you notice a gradual improvement in your pain and other symptoms. 5. You may continue to take your pain medication as needed. 6. DO NOT stop any other previously prescribed medications. You may resume blood thinning medications the day after the procedure. 7. In general, you should avoid any prolonged standing, walking, or repeated bending or heavy use of your upper extremities for twenty four to forty eight hours but may resume light      Normal activity when you leave the facility     8. If you are currently going to physical therapy, continue to do so unless your doctor instructs you not to.    9. If you develop any other symptoms such fever, rash, unusual drainage from the site, severe headache or weakness please call [phone]. 10. Other: If you had sedation ; Do not drink alcohol or sign any legal or financial contracts for 24 hours. Also do not drive        Or operate other types of machinery for 24 hours. You should have a responsible adult available to assist you for the rest of the day. Side effects include lightheadedness, dizziness & sleepiness.     11. Side effects of steroids can include: (these may last a few days then will go away on their own)           - facial flushing           - hot flashes           - nervous energy and difficulty sleeping at night           - muscle spasm or twitching           - fluid retention            - elevated blood

## 2023-07-11 NOTE — BRIEF OP NOTE
Brief Postoperative Note      Patient: Pincus Canavan  YOB: 1955  MRN: [id number]    Date of Procedure: 7/11/2023    Pre-Op Diagnosis Codes:     * Radiculopathy, lumbar region [M54.16]    Post-Op Diagnosis: Same       Procedure(s):  L4-L5 LUMBAR EPIDURAL STEROID INJECTION WITH FLUOROSCOPY    Surgeon(s):  Corinne Olivera MD    Assistant:  * No surgical staff found *    Anesthesia: IV Sedation    Estimated Blood Loss (mL): Minimal    Complications: None    Specimens:   * No specimens in log *    Implants:  * No implants in log *      Drains: * No LDAs found *    Findings:       Electronically signed by Corinne Olivera MD on 7/11/2023 at 8:40 AM

## 2023-07-11 NOTE — H&P
Update History & Physical    The patient's History and Physical  was complete don a paper form on 7-. The surgical site was confirmed by the patient and me. Plan: The risks, benefits, expected outcome, and alternative to the recommended procedure have been discussed with the patient. Patient understands and wants to proceed with the procedure.      Electronically signed by Eva Gamble MD on 7/11/2023 at 8:39 AM

## 2023-07-12 NOTE — OP NOTE
Operative Note      Patient: Guillermo Melgar  YOB: 1955  MRN: [id number]    Date of Procedure: 7/11/2023    Pre-Op Diagnosis Codes:     * Radiculopathy, lumbar region [M54.16]    Post-Op Diagnosis: Same       Procedure(s):  L4-L5 LUMBAR EPIDURAL STEROID INJECTION WITH FLUOROSCOPY    Surgeon(s):  Pete Fenton MD    Assistant:   * No surgical staff found *    Anesthesia: IV Sedation    Estimated Blood Loss (mL): Minimal    Complications: None    Specimens:   * No specimens in log *    Implants:  * No implants in log *      Drains: * No LDAs found *    Findings:         Detailed Description of Procedure:   Procedure in Detail:   The patient's chart was reviewed. After obtaining written informed consent, the patient had a 20 g IV placed, the patient was placed in the prone position with the leg slightly flexed. Versed and Fentanyl was given to the patient intravenous, monitors attached. Pre-procedure blood pressure and pulse were stable and recorded in patients clinic chart. MEDICAL NECESSITY: This patient has chronic pain that has failed to respond to conservative measures as outlined in the original history and last physical exam.   The patient's symptoms include Pain and disability of a moderate to severe degree with intermittent refereed pain. The goals of treatment are to 1) achieve optimal pain control, recognizing that a pain-free state may not be achievable; 2) minimize adverse outcomes; 3) enhance functional abilities, and physical and psychological well-being; and 4) enhance the quality of life for patients with chronic pain. The patient has documented pathology through radiographic imaging, the patient has the same or similar procedure in the past which resulted in significant improvement more than 50% reduction in the pain, as well improvement in their Activity of daily living and quality of life, and this would be the goal for the first time procedure.       PROCEDURE:   The patient was

## 2023-10-20 NOTE — PROGRESS NOTES
PATIENT REACHED   YES_X___NO____        PREOP INSTRUCTIONS LEFT ON VM NUMBER_______________      Date: 10/24/2023      Arrival Time: 7:30 am    Procedure Time: 8:30 am      Location: [address] [place] [address] [facility], [address]      Nothing to eat or drink after MIDNIGHT the evening before your procedure. You will need a responsible adulte 18 years or older to stay on site, and take you home after the procedure. Please bring a complete list of medications and supplements you currently take, with dosing. Wear loose comfortable clothes. Please follow any and all instructions the office has given you regarding medications that need to be stopped prior to procedure. Please verify with the office regarding blood thinners. The goal of blood sugar is to be under >200, the day of the procedure. If it is elevated you may be cancelled. ANY QUESTIONS CALL YOUR DOCTOR. ALSO,PLEASE READ THE INSTRUCTION PACKET FROM YOUR DR IF YOU RECEIVED ONE.    DR. Cadena Shadow > [phone]      Additional Information:      VISITOR POLICY(subject to change)    Current policy is 2 visitors per patient. No children. Masks are required.

## 2023-10-24 ENCOUNTER — HOSPITAL ENCOUNTER (OUTPATIENT)
Age: 68
Setting detail: OUTPATIENT SURGERY
Discharge: HOME | End: 2023-10-24
Attending: ANESTHESIOLOGY | Admitting: ANESTHESIOLOGY
Payer: MEDICARE

## 2023-10-24 ENCOUNTER — APPOINTMENT (OUTPATIENT)
Dept: GENERAL RADIOLOGY | Age: 68
End: 2023-10-24
Attending: ANESTHESIOLOGY
Payer: MEDICARE

## 2023-10-24 VITALS
SYSTOLIC BLOOD PRESSURE: 136 MMHG | BODY MASS INDEX: 34.07 KG/M2 | RESPIRATION RATE: 18 BRPM | TEMPERATURE: 98.5 F | HEART RATE: 86 BPM | HEIGHT: 69 IN | DIASTOLIC BLOOD PRESSURE: 74 MMHG | WEIGHT: 230 LBS | OXYGEN SATURATION: 99 %

## 2023-10-24 PROCEDURE — 2709999900 HC NON-CHARGEABLE SUPPLY: Performed by: ANESTHESIOLOGY

## 2023-10-24 PROCEDURE — 6360000002 HC RX W HCPCS: Performed by: ANESTHESIOLOGY

## 2023-10-24 PROCEDURE — 77003 FLUOROGUIDE FOR SPINE INJECT: CPT

## 2023-10-24 PROCEDURE — 3610000054 HC PAIN LEVEL 3 BASE (NON-OR): Performed by: ANESTHESIOLOGY

## 2023-10-24 PROCEDURE — 99152 MOD SED SAME PHYS/QHP 5/>YRS: CPT | Performed by: ANESTHESIOLOGY

## 2023-10-24 PROCEDURE — 2500000003 HC RX 250 WO HCPCS: Performed by: ANESTHESIOLOGY

## 2023-10-24 RX ORDER — FENTANYL CITRATE 50 UG/ML
INJECTION, SOLUTION INTRAMUSCULAR; INTRAVENOUS
Status: COMPLETED | OUTPATIENT
Start: 2023-10-24 | End: 2023-10-24

## 2023-10-24 RX ORDER — LIDOCAINE HYDROCHLORIDE 10 MG/ML
INJECTION, SOLUTION EPIDURAL; INFILTRATION; INTRACAUDAL; PERINEURAL
Status: COMPLETED | OUTPATIENT
Start: 2023-10-24 | End: 2023-10-24

## 2023-10-24 RX ORDER — METHYLPREDNISOLONE ACETATE 80 MG/ML
INJECTION, SUSPENSION INTRA-ARTICULAR; INTRALESIONAL; INTRAMUSCULAR; SOFT TISSUE
Status: COMPLETED | OUTPATIENT
Start: 2023-10-24 | End: 2023-10-24

## 2023-10-24 RX ORDER — MIDAZOLAM HYDROCHLORIDE 1 MG/ML
INJECTION INTRAMUSCULAR; INTRAVENOUS
Status: COMPLETED | OUTPATIENT
Start: 2023-10-24 | End: 2023-10-24

## 2023-10-24 ASSESSMENT — PAIN DESCRIPTION - DESCRIPTORS
DESCRIPTORS: ACHING

## 2023-10-24 ASSESSMENT — PAIN DESCRIPTION - PAIN TYPE: TYPE: CHRONIC PAIN

## 2023-10-24 ASSESSMENT — PAIN - FUNCTIONAL ASSESSMENT: PAIN_FUNCTIONAL_ASSESSMENT: 0-10

## 2023-10-24 ASSESSMENT — PAIN DESCRIPTION - LOCATION
LOCATION: BACK
LOCATION: BACK

## 2023-10-24 ASSESSMENT — PAIN DESCRIPTION - ORIENTATION
ORIENTATION: LOWER
ORIENTATION: LOWER

## 2023-10-24 ASSESSMENT — PAIN SCALES - GENERAL
PAINLEVEL_OUTOF10: 3
PAINLEVEL_OUTOF10: 4

## 2023-10-24 NOTE — PROGRESS NOTES
Brought to phase 2 recovery via chair. Does have dressing in place to the mid lower back which is CDI.  States pain level 4/10

## 2023-10-24 NOTE — DISCHARGE INSTRUCTIONS
1. Keep injection /surgical site dry until the band-aid/ dressing is removed. Please remove band-aids from the injection site 12-24 hours after the procedure. 2. If the injection site is sore, you may apply an ice pack to that area for twenty minutes every two hours for the initial twenty four hours. Do not use heat. 3. Occasionally you may notice slight increase in pain after the procedure. This should start to improve  within the next twenty four to forty eight hours. 4. Remember, it may take as long as forty eight hours before you notice a gradual improvement in your pain and other symptoms. 5. You may continue to take your pain medication as needed. 6. DO NOT stop any other previously prescribed medications. You may resume blood thinning medications the day after the procedure. 7. In general, you should avoid any prolonged standing, walking, or repeated bending or heavy use of your upper extremities for twenty four to forty eight hours but may resume light      Normal activity when you leave the facility     8. If you are currently going to physical therapy, continue to do so unless your doctor instructs you not to.    9. If you develop any other symptoms such fever, rash, unusual drainage from the site, severe headache or weakness please call [phone]. 10. Other: If you had sedation ; Do not drink alcohol or sign any legal or financial contracts for 24 hours. Also do not drive or        Or operate other types of machinery for 24 hours. You should have a responsible adult available to assist you for the rest of the day. 11. Side effects of steroids can include: (these may last a few days then will go away on their own)           - facial flushing           - hot flashes           - nervous energy and difficulty sleeping at night           - muscle spasm or twitching           - fluid retention            - elevated blood sugar levels in diabetics      12.  Please follow up with the

## 2023-10-24 NOTE — PROGRESS NOTES
IV discontinued, catheter intact, and dressing applied. Procedural dressing dry and intact. Bilateral lower extremities equal in strength. Discharge instructions reviewed with patient or responsible adult, signed and copy given. All home medications have been reviewed. All questions answered and patient or responsible adult verbalized understanding.   PAIN LEVEL AT DISCHARGE _3____

## 2023-10-24 NOTE — BRIEF OP NOTE
Brief Postoperative Note      Patient: Adria Olmos  YOB: 1955  MRN: [id number]    Date of Procedure: 10/24/2023    Pre-Op Diagnosis Codes:     * Radiculopathy, lumbar region [M54.16]    Post-Op Diagnosis: Same       Procedure(s):  L4-L5 EPIDURAL STEROID INJECTION WITH FLUOROSCOPY    Surgeon(s):  Cuong Cano MD    Assistant:  * No surgical staff found *    Anesthesia: IV Sedation    Estimated Blood Loss (mL): Minimal    Complications: None    Specimens:   * No specimens in log *    Implants:  * No implants in log *      Drains: * No LDAs found *    Findings:       Electronically signed by Cuong Cano MD on 10/24/2023 at 9:28 AM

## 2023-10-24 NOTE — H&P
Update History & Physical    The patient's History and Physical of October 4, 2023 was reviewed with the patient and I examined the patient. There was no change. The surgical site was confirmed by the patient and me. Plan: The risks, benefits, expected outcome, and alternative to the recommended procedure have been discussed with the patient. Patient understands and wants to proceed with the procedure.      Electronically signed by Clifton Barksdale MD on 10/24/2023 at 9:17 AM

## 2023-10-25 NOTE — OP NOTE
Operative Note      Patient: Magali Rodriguez  YOB: 1955  MRN: [id number]    Date of Procedure: 10/24/2023    Pre-Op Diagnosis Codes:     * Radiculopathy, lumbar region [M54.16]    Post-Op Diagnosis: Same       Procedure(s):  L4-L5 EPIDURAL STEROID INJECTION WITH FLUOROSCOPY    Surgeon(s):  Adriana Ricketts MD    Assistant:   * No surgical staff found *    Anesthesia: IV Sedation    Estimated Blood Loss (mL): Minimal    Complications: None    Specimens:   * No specimens in log *    Implants:  * No implants in log *      Drains: * No LDAs found *    Findings:         Detailed Description of Procedure:   Procedure in Detail:   The patient's chart was reviewed. After obtaining written informed consent, the patient had a 20 g IV placed, the patient was placed in the prone position with the leg slightly flexed. Versed and Fentanyl was given to the patient intravenous, monitors attached. Pre-procedure blood pressure and pulse were stable and recorded in patients clinic chart. MEDICAL NECESSITY: This patient has chronic pain that has failed to respond to conservative measures as outlined in the original history and last physical exam.   The patient's symptoms include Pain and disability of a moderate to severe degree with intermittent refereed pain. The goals of treatment are to 1) achieve optimal pain control, recognizing that a pain-free state may not be achievable; 2) minimize adverse outcomes; 3) enhance functional abilities, and physical and psychological well-being; and 4) enhance the quality of life for patients with chronic pain. The patient has documented pathology through radiographic imaging, the patient has the same or similar procedure in the past which resulted in significant improvement more than 50% reduction in the pain, as well improvement in their Activity of daily living and quality of life, and this would be the goal for the first time procedure.         PROCEDURE:   The patient was

## 2024-01-30 ENCOUNTER — APPOINTMENT (OUTPATIENT)
Dept: GENERAL RADIOLOGY | Age: 69
End: 2024-01-30
Attending: ANESTHESIOLOGY
Payer: MEDICARE

## 2024-01-30 ENCOUNTER — HOSPITAL ENCOUNTER (OUTPATIENT)
Age: 69
Setting detail: OUTPATIENT SURGERY
Discharge: HOME | End: 2024-01-30
Attending: ANESTHESIOLOGY | Admitting: ANESTHESIOLOGY
Payer: MEDICARE

## 2024-01-30 VITALS
RESPIRATION RATE: 16 BRPM | BODY MASS INDEX: 32.93 KG/M2 | SYSTOLIC BLOOD PRESSURE: 151 MMHG | HEIGHT: 70 IN | HEART RATE: 79 BPM | WEIGHT: 230 LBS | DIASTOLIC BLOOD PRESSURE: 73 MMHG | TEMPERATURE: 98.2 F | OXYGEN SATURATION: 100 %

## 2024-01-30 PROCEDURE — 2709999900 HC NON-CHARGEABLE SUPPLY: Performed by: ANESTHESIOLOGY

## 2024-01-30 PROCEDURE — 6360000004 HC RX CONTRAST MEDICATION: Performed by: ANESTHESIOLOGY

## 2024-01-30 PROCEDURE — 3610000054 HC PAIN LEVEL 3 BASE (NON-OR): Performed by: ANESTHESIOLOGY

## 2024-01-30 PROCEDURE — 2500000003 HC RX 250 WO HCPCS: Performed by: ANESTHESIOLOGY

## 2024-01-30 PROCEDURE — 6360000002 HC RX W HCPCS: Performed by: ANESTHESIOLOGY

## 2024-01-30 PROCEDURE — 99152 MOD SED SAME PHYS/QHP 5/>YRS: CPT | Performed by: ANESTHESIOLOGY

## 2024-01-30 RX ORDER — IOPAMIDOL 612 MG/ML
INJECTION, SOLUTION INTRATHECAL
Status: COMPLETED | OUTPATIENT
Start: 2024-01-30 | End: 2024-01-30

## 2024-01-30 RX ORDER — LIDOCAINE HYDROCHLORIDE 10 MG/ML
INJECTION, SOLUTION EPIDURAL; INFILTRATION; INTRACAUDAL; PERINEURAL
Status: COMPLETED | OUTPATIENT
Start: 2024-01-30 | End: 2024-01-30

## 2024-01-30 RX ORDER — METHYLPREDNISOLONE ACETATE 80 MG/ML
INJECTION, SUSPENSION INTRA-ARTICULAR; INTRALESIONAL; INTRAMUSCULAR; SOFT TISSUE
Status: COMPLETED | OUTPATIENT
Start: 2024-01-30 | End: 2024-01-30

## 2024-01-30 RX ORDER — FENTANYL CITRATE 50 UG/ML
INJECTION, SOLUTION INTRAMUSCULAR; INTRAVENOUS
Status: COMPLETED | OUTPATIENT
Start: 2024-01-30 | End: 2024-01-30

## 2024-01-30 RX ORDER — MIDAZOLAM HYDROCHLORIDE 1 MG/ML
INJECTION INTRAMUSCULAR; INTRAVENOUS
Status: COMPLETED | OUTPATIENT
Start: 2024-01-30 | End: 2024-01-30

## 2024-01-30 ASSESSMENT — PAIN DESCRIPTION - DESCRIPTORS: DESCRIPTORS: BURNING;STABBING

## 2024-01-30 NOTE — OP NOTE
was placed in the prone position on fluoroscopy table.  The lower back was prepped under strict Aseptic Technique with antiseptic solution and draped in the usual sterile fashion.  The skin over the L 4- L5 was identified under fluoroscopic guidance and infiltrated with 3 ml of 1% Lidocaine for local anesthesia via 25 gauge needle.  An 18-gauge Touhy needle was used under fluoroscopic guidance to access the epidural space using loss of resistance to air technique.   Approximately 3 ml of Omnipaque 300 contrast was injected under continuous fluoroscopic guidance and good spread was noted following a negative aspiration  Following negative aspiration, a mixture of 4 ml Preservative free normal Saline and Depomedrol 80 mg = 2 ml was injected with minimal pressure. There was no evidence of CSF, paresthesia or heme. The needle was cleared with preservative free local anesthetic and removed. Skin was cleaned and a sterile dressing was applied.      Following the procedure the patient's vital signs were stable. The patient was discharged home in good condition after being given discharge instructions.      Electronically signed by MADELAINE GEORGE MD on 1/30/2024 at 2:43 PM

## 2024-01-30 NOTE — H&P
Update History & Physical    The patient's History and Physical was completed on a paper form on 1-. The surgical site was confirmed by the patient and me.       Plan: The risks, benefits, expected outcome, and alternative to the recommended procedure have been discussed with the patient. Patient understands and wants to proceed with the procedure.     Electronically signed by MADELAINE GEORGE MD on 1/30/2024 at 8:47 AM

## 2024-01-30 NOTE — BRIEF OP NOTE
Brief Postoperative Note      Patient: River Rodas  YOB: 1955  MRN: [id number]    Date of Procedure: 1/30/2024    Pre-Op Diagnosis Codes:     * Radiculopathy, lumbar region [M54.16]    Post-Op Diagnosis: Same       Procedure(s):  L4-L5 EPIDURAL STEROID INJECTION WITH FLUOROSCOPY    Surgeon(s):  Madelaine Tovar MD    Assistant:  * No surgical staff found *    Anesthesia: IV Sedation    Estimated Blood Loss (mL): Minimal    Complications: None    Specimens:   * No specimens in log *    Implants:  * No implants in log *      Drains: * No LDAs found *    Findings:       Electronically signed by MADELAINE TOVAR MD on 1/30/2024 at 8:48 AM

## 2024-01-30 NOTE — PROGRESS NOTES
Brought to phase 2 recovery via chair.    Procedural dressing dry and intact.    Bilateral lower extremities equal in strength.    Discharge instructions reviewed with patient or responsible adult, signed and copy given.  All home medications have been reviewed.  All questions answered and patient or responsible adult verbalized understanding.  PAIN LEVEL AT entry to phase 2 recovery _0____

## 2024-01-30 NOTE — DISCHARGE INSTRUCTIONS
1. Keep injection /surgical site dry until the band-aid/ dressing is removed. Please remove band-aids from the injection site 12-24 hours after the procedure.     2. If the injection site is sore, you may apply an ice pack to that area for twenty minutes every two hours for the initial twenty four hours. Do not use heat.    3. Occasionally you may notice slight increase in pain after the procedure. This should start to improve  within the next twenty four to forty eight hours.    4. Remember, it may take as long as forty eight hours before you notice a gradual improvement in your pain and other symptoms.    5. You may continue to take your pain medication as needed.    6. DO NOT stop any other previously prescribed medications. You may resume blood thinning medications the day after the procedure.     7. In general, you should avoid any prolonged standing, walking, or repeated bending or heavy use of your upper extremities for twenty four to forty eight hours but may resume light      Normal activity when you leave the facility     8. If you are currently going to physical therapy, continue to do so unless your doctor instructs you not to.    9. If you develop any other symptoms such fever, rash, unusual drainage from the site, severe headache or weakness please call [phone].    10. Other: If you had sedation ; Do not drink alcohol or sign any legal or financial contracts for 24 hours. Also do not drive or        Or operate other types of machinery for 24 hours. You should have a responsible adult available to assist you for the rest of the day.     11. Side effects of steroids can include: (these may last a few days then will go away on their own)           - facial flushing           - hot flashes           - nervous energy and difficulty sleeping at night           - muscle spasm or twitching           - fluid retention            - elevated blood sugar levels in diabetics      12. Please follow up with the

## 2024-02-27 ENCOUNTER — HOSPITAL ENCOUNTER (EMERGENCY)
Age: 69
Discharge: HOME | End: 2024-02-27
Payer: MEDICARE

## 2024-02-27 ENCOUNTER — APPOINTMENT (OUTPATIENT)
Dept: GENERAL RADIOLOGY | Age: 69
End: 2024-02-27
Payer: MEDICARE

## 2024-02-27 VITALS
HEIGHT: 70 IN | DIASTOLIC BLOOD PRESSURE: 86 MMHG | BODY MASS INDEX: 32.93 KG/M2 | HEART RATE: 85 BPM | OXYGEN SATURATION: 95 % | TEMPERATURE: 97.7 F | RESPIRATION RATE: 18 BRPM | SYSTOLIC BLOOD PRESSURE: 125 MMHG | WEIGHT: 230 LBS

## 2024-02-27 DIAGNOSIS — J10.1 INFLUENZA A: Primary | ICD-10-CM

## 2024-02-27 LAB
FLUAV RNA RESP QL NAA+PROBE: DETECTED
FLUBV RNA RESP QL NAA+PROBE: NOT DETECTED
SARS-COV-2 RNA RESP QL NAA+PROBE: NOT DETECTED

## 2024-02-27 PROCEDURE — 94640 AIRWAY INHALATION TREATMENT: CPT

## 2024-02-27 PROCEDURE — 6370000000 HC RX 637 (ALT 250 FOR IP): Performed by: PHYSICIAN ASSISTANT

## 2024-02-27 PROCEDURE — 87636 SARSCOV2 & INF A&B AMP PRB: CPT

## 2024-02-27 PROCEDURE — 99284 EMERGENCY DEPT VISIT MOD MDM: CPT

## 2024-02-27 PROCEDURE — 71046 X-RAY EXAM CHEST 2 VIEWS: CPT

## 2024-02-27 RX ORDER — BENZONATATE 100 MG/1
100 CAPSULE ORAL 3 TIMES DAILY PRN
Qty: 30 CAPSULE | Refills: 0 | Status: SHIPPED | OUTPATIENT
Start: 2024-02-27 | End: 2024-03-05

## 2024-02-27 RX ORDER — OSELTAMIVIR PHOSPHATE 75 MG/1
75 CAPSULE ORAL 2 TIMES DAILY
Qty: 10 CAPSULE | Refills: 0 | Status: SHIPPED | OUTPATIENT
Start: 2024-02-27 | End: 2024-03-03

## 2024-02-27 RX ORDER — ALBUTEROL SULFATE 90 UG/1
2 AEROSOL, METERED RESPIRATORY (INHALATION) 4 TIMES DAILY PRN
Qty: 54 G | Refills: 1 | Status: SHIPPED | OUTPATIENT
Start: 2024-02-27

## 2024-02-27 RX ORDER — PREDNISONE 20 MG/1
60 TABLET ORAL ONCE
Status: COMPLETED | OUTPATIENT
Start: 2024-02-27 | End: 2024-02-27

## 2024-02-27 RX ORDER — IPRATROPIUM BROMIDE AND ALBUTEROL SULFATE 2.5; .5 MG/3ML; MG/3ML
1 SOLUTION RESPIRATORY (INHALATION) ONCE
Status: COMPLETED | OUTPATIENT
Start: 2024-02-27 | End: 2024-02-27

## 2024-02-27 RX ORDER — PREDNISONE 10 MG/1
60 TABLET ORAL DAILY
Qty: 30 TABLET | Refills: 0 | Status: SHIPPED | OUTPATIENT
Start: 2024-02-27 | End: 2024-03-03

## 2024-02-27 RX ADMIN — IPRATROPIUM BROMIDE AND ALBUTEROL SULFATE 1 DOSE: 2.5; .5 SOLUTION RESPIRATORY (INHALATION) at 18:27

## 2024-02-27 RX ADMIN — PREDNISONE 60 MG: 20 TABLET ORAL at 18:18

## 2024-02-27 ASSESSMENT — ENCOUNTER SYMPTOMS
WHEEZING: 0
STRIDOR: 0
NAUSEA: 0
BACK PAIN: 0
COUGH: 1
RHINORRHEA: 1
ABDOMINAL PAIN: 0
VOICE CHANGE: 0
EYES NEGATIVE: 1
VOMITING: 0
SORE THROAT: 0
CONSTIPATION: 0
SHORTNESS OF BREATH: 0
DIARRHEA: 0
TROUBLE SWALLOWING: 0
COLOR CHANGE: 0

## 2024-02-27 ASSESSMENT — LIFESTYLE VARIABLES
HOW OFTEN DO YOU HAVE A DRINK CONTAINING ALCOHOL: NEVER
HOW MANY STANDARD DRINKS CONTAINING ALCOHOL DO YOU HAVE ON A TYPICAL DAY: PATIENT DOES NOT DRINK

## 2024-02-27 ASSESSMENT — PAIN - FUNCTIONAL ASSESSMENT: PAIN_FUNCTIONAL_ASSESSMENT: NONE - DENIES PAIN

## 2024-02-27 NOTE — ED PROVIDER NOTES
[facility] EMERGENCY DEPARTMENT  EMERGENCY DEPARTMENT ENCOUNTER        Pt Name: River Rodas  MRN: [id number]  Birthdate 1955  Date of evaluation: 2/27/2024  Provider: Tad Castañeda PA-C  PCP: Jovanny Lmi MD  Note Started: 5:27 PM EST 2/27/24      OMAIRA. I have evaluated this patient.        CHIEF COMPLAINT       Chief Complaint   Patient presents with    Influenza     PT via EMS from home, c/o flu like symptoms        HISTORY OF PRESENT ILLNESS: 1 or more Elements     History from : Patient    Limitations to history : None    River Rodas is a 68 y.o. male who presents to the emergency department complaining of flulike symptoms for 5 days.  He reports cough, congestion, wheezing, chills.  He denies chest pain, palpitations, hemoptysis, pain or swelling in extremities.  He is a former smoker.  Denies history of known COPD or asthma.     Nursing Notes were all reviewed and agreed with or any disagreements were addressed in the HPI.    REVIEW OF SYSTEMS :      Review of Systems   Constitutional:  Positive for chills and fatigue. Negative for fever.   HENT:  Positive for congestion and rhinorrhea. Negative for dental problem, drooling, ear discharge, ear pain, postnasal drip, sore throat, tinnitus, trouble swallowing and voice change.    Eyes: Negative.    Respiratory:  Positive for cough. Negative for shortness of breath, wheezing and stridor.    Cardiovascular:  Negative for chest pain, palpitations and leg swelling.   Gastrointestinal:  Negative for abdominal pain, constipation, diarrhea, nausea and vomiting.   Genitourinary: Negative.    Musculoskeletal:  Negative for back pain, neck pain and neck stiffness.   Skin:  Negative for color change, pallor, rash and wound.   Neurological: Negative.    Psychiatric/Behavioral:  Negative for confusion.    All other systems reviewed and are negative.      Positives and Pertinent negatives as per HPI.     SURGICAL HISTORY

## 2024-05-01 ENCOUNTER — HOSPITAL ENCOUNTER (OUTPATIENT)
Age: 69
Discharge: HOME | End: 2024-05-01
Payer: MEDICARE

## 2024-05-01 ENCOUNTER — HOSPITAL ENCOUNTER (OUTPATIENT)
Dept: GENERAL RADIOLOGY | Age: 69
Discharge: HOME | End: 2024-05-01
Payer: MEDICARE

## 2024-05-01 DIAGNOSIS — R52 PAIN: ICD-10-CM

## 2024-05-01 PROCEDURE — 72100 X-RAY EXAM L-S SPINE 2/3 VWS: CPT

## 2024-06-19 NOTE — PROGRESS NOTES
PATIENT REACHED   YES____NO__X__        PREOP INSTRUCTIONS LEFT ON VM NUMBER:       Date: 6/ 25/ 2024      Arrival Time: 10:00 AM      Procedure Time: 11:00 AM      Location: [address] [place] [address]. [place] [address]      Nothing to eat or drink after MIDNIGHT the evening before your procedure.    You will need a responsible adult 18 years or older to stay on site, and take you home after the procedure.    Please bring a complete list of medications and supplements you currently take, with dosing.    Wear loose comfortable clothes.    Please follow any and all instructions the office has given you regarding medications that need to be stopped prior to procedure.     Please verify with the office regarding blood thinners.    The goal of blood sugar is to be under >200, the day of the procedure. If it is elevated you may be cancelled.    ANY QUESTIONS CALL YOUR DOCTOR.ALSO,PLEASE READ THE INSTRUCTION PACKET FROM YOUR DR IF YOU RECEIVED ONE.    DR. GEORGE > [phone]      Additional Information:      VISITOR POLICY(subject to change)    Current policy is 2 visitors per patient. No children under 18. Masks are optional.

## 2024-06-25 ENCOUNTER — HOSPITAL ENCOUNTER (OUTPATIENT)
Age: 69
Setting detail: OUTPATIENT SURGERY
Discharge: HOME | End: 2024-06-25
Attending: ANESTHESIOLOGY | Admitting: ANESTHESIOLOGY
Payer: MEDICARE

## 2024-06-25 ENCOUNTER — APPOINTMENT (OUTPATIENT)
Dept: GENERAL RADIOLOGY | Age: 69
End: 2024-06-25
Attending: ANESTHESIOLOGY
Payer: MEDICARE

## 2024-06-25 VITALS
TEMPERATURE: 97.3 F | HEIGHT: 70 IN | BODY MASS INDEX: 32.93 KG/M2 | DIASTOLIC BLOOD PRESSURE: 78 MMHG | SYSTOLIC BLOOD PRESSURE: 139 MMHG | WEIGHT: 230 LBS | HEART RATE: 82 BPM | OXYGEN SATURATION: 99 % | RESPIRATION RATE: 16 BRPM

## 2024-06-25 PROCEDURE — 2500000003 HC RX 250 WO HCPCS: Performed by: ANESTHESIOLOGY

## 2024-06-25 PROCEDURE — 6360000002 HC RX W HCPCS: Performed by: ANESTHESIOLOGY

## 2024-06-25 PROCEDURE — 3610000054 HC PAIN LEVEL 3 BASE (NON-OR): Performed by: ANESTHESIOLOGY

## 2024-06-25 PROCEDURE — 2709999900 HC NON-CHARGEABLE SUPPLY: Performed by: ANESTHESIOLOGY

## 2024-06-25 PROCEDURE — 99152 MOD SED SAME PHYS/QHP 5/>YRS: CPT | Performed by: ANESTHESIOLOGY

## 2024-06-25 PROCEDURE — 77003 FLUOROGUIDE FOR SPINE INJECT: CPT

## 2024-06-25 RX ORDER — MIDAZOLAM HYDROCHLORIDE 1 MG/ML
INJECTION INTRAMUSCULAR; INTRAVENOUS
Status: COMPLETED | OUTPATIENT
Start: 2024-06-25 | End: 2024-06-25

## 2024-06-25 RX ORDER — BUPIVACAINE HYDROCHLORIDE 2.5 MG/ML
INJECTION, SOLUTION EPIDURAL; INFILTRATION; INTRACAUDAL
Status: COMPLETED | OUTPATIENT
Start: 2024-06-25 | End: 2024-06-25

## 2024-06-25 RX ORDER — FENTANYL CITRATE 50 UG/ML
INJECTION, SOLUTION INTRAMUSCULAR; INTRAVENOUS
Status: COMPLETED | OUTPATIENT
Start: 2024-06-25 | End: 2024-06-25

## 2024-06-25 RX ORDER — LIDOCAINE HYDROCHLORIDE 10 MG/ML
INJECTION, SOLUTION EPIDURAL; INFILTRATION; INTRACAUDAL; PERINEURAL
Status: COMPLETED | OUTPATIENT
Start: 2024-06-25 | End: 2024-06-25

## 2024-06-25 RX ORDER — METHYLPREDNISOLONE ACETATE 80 MG/ML
INJECTION, SUSPENSION INTRA-ARTICULAR; INTRALESIONAL; INTRAMUSCULAR; SOFT TISSUE
Status: COMPLETED | OUTPATIENT
Start: 2024-06-25 | End: 2024-06-25

## 2024-06-25 ASSESSMENT — PAIN - FUNCTIONAL ASSESSMENT
PAIN_FUNCTIONAL_ASSESSMENT: 0-10
PAIN_FUNCTIONAL_ASSESSMENT: PREVENTS OR INTERFERES SOME ACTIVE ACTIVITIES AND ADLS
PAIN_FUNCTIONAL_ASSESSMENT: 0-10
PAIN_FUNCTIONAL_ASSESSMENT: 0-10

## 2024-06-25 ASSESSMENT — PAIN DESCRIPTION - DESCRIPTORS: DESCRIPTORS: THROBBING

## 2024-06-25 NOTE — DISCHARGE INSTRUCTIONS
1. Keep injection /surgical site dry until the band-aid/ dressing is removed. Please remove band-aids from the injection site 12-24 hours after the procedure.     2. If the injection site is sore, you may apply an ice pack to that area for twenty minutes every two hours for the initial twenty four hours. Do not use heat.    3. Occasionally you may notice slight increase in pain after the procedure. This should start to improve  within the next twenty four to forty eight hours.    4. Remember, it may take as long as forty eight hours before you notice a gradual improvement in your pain and other symptoms.    5. You may continue to take your pain medication as needed.    6. DO NOT stop any other previously prescribed medications. You may resume blood thinning medications the day after the procedure.     7. In general, you should avoid any prolonged standing, walking, or repeated bending or heavy use of your upper extremities for twenty four to forty eight hours but may resume light      Normal activity when you leave the facility     8. If you are currently going to physical therapy, continue to do so unless your doctor instructs you not to.    9. If you develop any other symptoms such fever, rash, unusual drainage from the site, severe headache or weakness please call [phone].    10. Other: If you had sedation ; Do not drink alcohol or sign any legal or financial contracts for 24 hours. Also do not drive or        Or operate other types of machinery for 24 hours. You should have a responsible adult available to assist you for the rest of the day.     11. Side effects of steroids can include: (these may last a few days then will go away on their own)           - facial flushing           - hot flashes           - nervous energy and difficulty sleeping at night           - muscle spasm or twitching           - fluid retention            - elevated blood sugar levels in diabetics      12. Please follow up with the

## 2024-06-25 NOTE — BRIEF OP NOTE
Brief Postoperative Note      Patient: River Rodas  YOB: 1955  MRN: [id number]    Date of Procedure: 6/25/2024    Pre-Op Diagnosis Codes:     * Lumbar radiculopathy [M54.16]    Post-Op Diagnosis: Same       Procedure(s):  L4-L5 LUMBAR EPIDURAL STEROID INJECTION WITH FLUOROSCOPY    Surgeon(s):  Madelaine Tovar MD    Assistant:  * No surgical staff found *    Anesthesia: IV Sedation    Estimated Blood Loss (mL): Minimal    Complications: None    Specimens:   * No specimens in log *    Implants:  * No implants in log *      Drains: * No LDAs found *    Findings:  Infection Present At Time Of Surgery (PATOS) (choose all levels that have infection present):  No infection present  Other Findings: sever stenosis     Electronically signed by MADELAINE TOVAR MD on 6/25/2024 at 10:23 AM

## 2024-06-25 NOTE — H&P
Update History & Physical    The patient's History and Physical of May 30, 2024 was reviewed with the patient and I examined the patient. There was no change. The surgical site was confirmed by the patient and me.       Plan: The risks, benefits, expected outcome, and alternative to the recommended procedure have been discussed with the patient. Patient understands and wants to proceed with the procedure.     Electronically signed by MADELAINE GEORGE MD on 6/25/2024 at 10:22 AM

## 2024-06-26 NOTE — OP NOTE
Operative Note      Patient: River Rodas  YOB: 1955  MRN: [id number]    Date of Procedure: 6/25/2024    Pre-Op Diagnosis Codes:     * Lumbar radiculopathy [M54.16]    Post-Op Diagnosis: Same       Procedure(s):  L4-L5 LUMBAR EPIDURAL STEROID INJECTION WITH FLUOROSCOPY    Surgeon(s):  Dyana Tovar MD    Assistant:   * No surgical staff found *    Anesthesia: IV Sedation    Estimated Blood Loss (mL): Minimal    Complications: None    Specimens:   * No specimens in log *    Implants:  * No implants in log *      Drains: * No LDAs found *    Findings:  Infection Present At Time Of Surgery (PATOS) (choose all levels that have infection present):  No infection present  Other Findings:     Detailed Description of Procedure:   Procedure in Detail:   The patient's chart was reviewed. After obtaining written informed consent, the patient had a 20 g IV placed, the patient was placed in the prone position with the leg slightly flexed. Versed and Fentanyl was given to the patient intravenous, monitors attached.  Pre-procedure blood pressure and pulse were stable and recorded in patients clinic chart.       MEDICAL NECESSITY: This patient has chronic pain that has failed to respond to conservative measures as outlined in the original history and last physical exam.   The patient's symptoms include Pain and disability of a moderate to severe degree with intermittent refereed pain. The goals of treatment are to 1) achieve optimal pain control, recognizing that a pain-free state may not be achievable; 2) minimize adverse outcomes; 3) enhance functional abilities, and physical and psychological well-being; and 4) enhance the quality of life for patients with chronic pain.  The patient has documented pathology through radiographic imaging, the patient has the same or similar procedure in the past which resulted in significant improvement more than 50% reduction in the pain, as well improvement in their Activity of

## 2024-07-06 PROBLEM — I10 HYPERTENSION: Status: ACTIVE | Noted: 2024-07-06

## 2024-07-06 PROBLEM — E78.5 HYPERLIPIDEMIA: Status: ACTIVE | Noted: 2024-07-06

## 2024-07-06 PROBLEM — I48.91 ATRIAL FIBRILLATION (HCC): Status: ACTIVE | Noted: 2024-07-06

## 2024-07-06 PROBLEM — M54.9 CHRONIC BACK PAIN: Status: ACTIVE | Noted: 2024-07-06

## 2024-07-06 PROBLEM — G89.29 CHRONIC BACK PAIN: Status: ACTIVE | Noted: 2024-07-06

## 2024-07-06 PROBLEM — F32.A DEPRESSION: Status: ACTIVE | Noted: 2024-07-06

## 2024-07-06 PROBLEM — F41.9 ANXIETY: Status: ACTIVE | Noted: 2024-07-06

## 2024-07-06 RX ORDER — OMEGA-3-ACID ETHYL ESTERS 1 G/1
2 CAPSULE, LIQUID FILLED ORAL 2 TIMES DAILY
COMMUNITY
Start: 2024-05-18

## 2024-07-06 RX ORDER — LUBIPROSTONE 24 UG/1
24 CAPSULE ORAL 2 TIMES DAILY
COMMUNITY
Start: 2024-05-17

## 2024-07-06 RX ORDER — LISINOPRIL AND HYDROCHLOROTHIAZIDE 12.5; 1 MG/1; MG/1
1 TABLET ORAL DAILY
COMMUNITY
End: 2024-07-09

## 2024-07-06 RX ORDER — ATORVASTATIN CALCIUM 40 MG/1
40 TABLET, FILM COATED ORAL DAILY
COMMUNITY
Start: 2024-06-10

## 2024-07-06 RX ORDER — ARIPIPRAZOLE 5 MG/1
5 TABLET ORAL DAILY
COMMUNITY
Start: 2024-05-24

## 2024-07-06 RX ORDER — FENOFIBRATE 160 MG/1
160 TABLET ORAL DAILY
COMMUNITY
End: 2024-07-10

## 2024-07-06 NOTE — PROGRESS NOTES
monitor.        RTO: Return in about 4 weeks (around 8/6/2024) for AWV.      If the patient has a future appointment, it is displayed below:  Future Appointments   Date Time Provider Department Center   8/13/2024 12:30 PM Dee Tejeda DO [facility] [place] [facility]           Electronically signed by Dee Tejeda DO on 7/9/2024 at 12:23 PM

## 2024-07-09 ENCOUNTER — OFFICE VISIT (OUTPATIENT)
Dept: FAMILY MEDICINE CLINIC | Age: 69
End: 2024-07-09
Payer: MEDICARE

## 2024-07-09 VITALS
DIASTOLIC BLOOD PRESSURE: 84 MMHG | HEART RATE: 103 BPM | BODY MASS INDEX: 33.37 KG/M2 | TEMPERATURE: 98.7 F | HEIGHT: 67 IN | WEIGHT: 212.6 LBS | OXYGEN SATURATION: 97 % | SYSTOLIC BLOOD PRESSURE: 142 MMHG

## 2024-07-09 DIAGNOSIS — Z76.89 ENCOUNTER TO ESTABLISH CARE: Primary | ICD-10-CM

## 2024-07-09 DIAGNOSIS — R73.9 HYPERGLYCEMIA: ICD-10-CM

## 2024-07-09 DIAGNOSIS — G62.9 NEUROPATHY: ICD-10-CM

## 2024-07-09 DIAGNOSIS — F41.9 ANXIETY: ICD-10-CM

## 2024-07-09 DIAGNOSIS — E78.2 MIXED HYPERLIPIDEMIA: ICD-10-CM

## 2024-07-09 DIAGNOSIS — F33.42 RECURRENT MAJOR DEPRESSIVE DISORDER, IN FULL REMISSION (HCC): ICD-10-CM

## 2024-07-09 DIAGNOSIS — E03.9 ACQUIRED HYPOTHYROIDISM: ICD-10-CM

## 2024-07-09 DIAGNOSIS — G89.29 CHRONIC BILATERAL LOW BACK PAIN WITHOUT SCIATICA: ICD-10-CM

## 2024-07-09 DIAGNOSIS — M54.50 CHRONIC BILATERAL LOW BACK PAIN WITHOUT SCIATICA: ICD-10-CM

## 2024-07-09 DIAGNOSIS — I10 PRIMARY HYPERTENSION: ICD-10-CM

## 2024-07-09 PROCEDURE — G2211 COMPLEX E/M VISIT ADD ON: HCPCS | Performed by: STUDENT IN AN ORGANIZED HEALTH CARE EDUCATION/TRAINING PROGRAM

## 2024-07-09 PROCEDURE — 3079F DIAST BP 80-89 MM HG: CPT | Performed by: STUDENT IN AN ORGANIZED HEALTH CARE EDUCATION/TRAINING PROGRAM

## 2024-07-09 PROCEDURE — 3017F COLORECTAL CA SCREEN DOC REV: CPT | Performed by: STUDENT IN AN ORGANIZED HEALTH CARE EDUCATION/TRAINING PROGRAM

## 2024-07-09 PROCEDURE — 3077F SYST BP >= 140 MM HG: CPT | Performed by: STUDENT IN AN ORGANIZED HEALTH CARE EDUCATION/TRAINING PROGRAM

## 2024-07-09 PROCEDURE — 1123F ACP DISCUSS/DSCN MKR DOCD: CPT | Performed by: STUDENT IN AN ORGANIZED HEALTH CARE EDUCATION/TRAINING PROGRAM

## 2024-07-09 PROCEDURE — G8417 CALC BMI ABV UP PARAM F/U: HCPCS | Performed by: STUDENT IN AN ORGANIZED HEALTH CARE EDUCATION/TRAINING PROGRAM

## 2024-07-09 PROCEDURE — 1036F TOBACCO NON-USER: CPT | Performed by: STUDENT IN AN ORGANIZED HEALTH CARE EDUCATION/TRAINING PROGRAM

## 2024-07-09 PROCEDURE — 99204 OFFICE O/P NEW MOD 45 MIN: CPT | Performed by: STUDENT IN AN ORGANIZED HEALTH CARE EDUCATION/TRAINING PROGRAM

## 2024-07-09 PROCEDURE — G8427 DOCREV CUR MEDS BY ELIG CLIN: HCPCS | Performed by: STUDENT IN AN ORGANIZED HEALTH CARE EDUCATION/TRAINING PROGRAM

## 2024-07-09 RX ORDER — TRAZODONE HYDROCHLORIDE 100 MG/1
100 TABLET ORAL NIGHTLY
Qty: 90 TABLET | Refills: 3 | Status: SHIPPED | OUTPATIENT
Start: 2024-07-09

## 2024-07-09 RX ORDER — HYDROCHLOROTHIAZIDE 12.5 MG/1
12.5 CAPSULE, GELATIN COATED ORAL EVERY MORNING
Qty: 30 CAPSULE | Refills: 1 | Status: SHIPPED | OUTPATIENT
Start: 2024-07-09

## 2024-07-09 RX ORDER — AMLODIPINE BESYLATE 10 MG/1
10 TABLET ORAL DAILY
Qty: 90 TABLET | Refills: 3 | Status: SHIPPED | OUTPATIENT
Start: 2024-07-09

## 2024-07-09 SDOH — ECONOMIC STABILITY: FOOD INSECURITY: WITHIN THE PAST 12 MONTHS, THE FOOD YOU BOUGHT JUST DIDN'T LAST AND YOU DIDN'T HAVE MONEY TO GET MORE.: NEVER TRUE

## 2024-07-09 SDOH — ECONOMIC STABILITY: INCOME INSECURITY: HOW HARD IS IT FOR YOU TO PAY FOR THE VERY BASICS LIKE FOOD, HOUSING, MEDICAL CARE, AND HEATING?: NOT HARD AT ALL

## 2024-07-09 SDOH — ECONOMIC STABILITY: HOUSING INSECURITY
IN THE LAST 12 MONTHS, WAS THERE A TIME WHEN YOU DID NOT HAVE A STEADY PLACE TO SLEEP OR SLEPT IN A SHELTER (INCLUDING NOW)?: NO

## 2024-07-09 SDOH — ECONOMIC STABILITY: FOOD INSECURITY: WITHIN THE PAST 12 MONTHS, YOU WORRIED THAT YOUR FOOD WOULD RUN OUT BEFORE YOU GOT MONEY TO BUY MORE.: NEVER TRUE

## 2024-07-09 ASSESSMENT — PATIENT HEALTH QUESTIONNAIRE - PHQ9
10. IF YOU CHECKED OFF ANY PROBLEMS, HOW DIFFICULT HAVE THESE PROBLEMS MADE IT FOR YOU TO DO YOUR WORK, TAKE CARE OF THINGS AT HOME, OR GET ALONG WITH OTHER PEOPLE: NOT DIFFICULT AT ALL
4. FEELING TIRED OR HAVING LITTLE ENERGY: NOT AT ALL
9. THOUGHTS THAT YOU WOULD BE BETTER OFF DEAD, OR OF HURTING YOURSELF: NOT AT ALL
8. MOVING OR SPEAKING SO SLOWLY THAT OTHER PEOPLE COULD HAVE NOTICED. OR THE OPPOSITE, BEING SO FIGETY OR RESTLESS THAT YOU HAVE BEEN MOVING AROUND A LOT MORE THAN USUAL: NOT AT ALL
5. POOR APPETITE OR OVEREATING: NOT AT ALL
6. FEELING BAD ABOUT YOURSELF - OR THAT YOU ARE A FAILURE OR HAVE LET YOURSELF OR YOUR FAMILY DOWN: NOT AT ALL
SUM OF ALL RESPONSES TO PHQ QUESTIONS 1-9: 0
SUM OF ALL RESPONSES TO PHQ QUESTIONS 1-9: 0
2. FEELING DOWN, DEPRESSED OR HOPELESS: NOT AT ALL
1. LITTLE INTEREST OR PLEASURE IN DOING THINGS: NOT AT ALL
SUM OF ALL RESPONSES TO PHQ QUESTIONS 1-9: 0
SUM OF ALL RESPONSES TO PHQ9 QUESTIONS 1 & 2: 0
7. TROUBLE CONCENTRATING ON THINGS, SUCH AS READING THE NEWSPAPER OR WATCHING TELEVISION: NOT AT ALL
3. TROUBLE FALLING OR STAYING ASLEEP: NOT AT ALL
SUM OF ALL RESPONSES TO PHQ QUESTIONS 1-9: 0

## 2024-07-09 NOTE — PATIENT INSTRUCTIONS
to schedule transportation.      All [facility] [employer] [facility] are Private Pay  Schedule 3 business days in advance     Provider Phone   [facility] [phone]   [facility] [phone]   [facility] [phone]   [facility] [phone]   [facility] [phone]   [facility] [phone]   [facility] [phone]   [facility] [phone]   [facility] [phone]   [facility] [phone]   [facility] [phone]

## 2024-07-10 RX ORDER — FLUOXETINE HYDROCHLORIDE 40 MG/1
40 CAPSULE ORAL DAILY
COMMUNITY

## 2024-07-10 RX ORDER — LEVOTHYROXINE SODIUM 175 UG/1
175 TABLET ORAL DAILY
COMMUNITY

## 2024-07-11 ENCOUNTER — TELEPHONE (OUTPATIENT)
Dept: FAMILY MEDICINE CLINIC | Age: 69
End: 2024-07-11

## 2024-07-11 NOTE — TELEPHONE ENCOUNTER
Patient called and he began taking the:    hydroCHLOROthiazide 12.5 MG capsule [[id number]]     This morning at 7am and he has only urinated 1 time since than.   Please give him a call to discuss weather or not to continue with the medication. He can be reached at [phone].     Please advise.

## 2024-07-11 NOTE — TELEPHONE ENCOUNTER
Spoke with patient, asked if he was staying hydrated patient denied this and stated that he did not know that he was supposed to drink water while taking this medication, I told patient to drink more water and take the medication for the next few days with agreement from  and to let us know if he is still unable to urinate after the next few days are up. Patient agreed.

## 2024-07-25 RX ORDER — TRAZODONE HYDROCHLORIDE 100 MG/1
100 TABLET ORAL NIGHTLY
Qty: 90 TABLET | Refills: 3 | OUTPATIENT
Start: 2024-07-25

## 2024-07-25 RX ORDER — ARIPIPRAZOLE 5 MG/1
5 TABLET ORAL DAILY
Qty: 30 TABLET | Refills: 0 | Status: SHIPPED | OUTPATIENT
Start: 2024-07-25

## 2024-07-25 RX ORDER — LEVOTHYROXINE SODIUM 175 UG/1
175 TABLET ORAL DAILY
Qty: 30 TABLET | Refills: 0 | Status: SHIPPED | OUTPATIENT
Start: 2024-07-25

## 2024-07-25 RX ORDER — FLUOXETINE HYDROCHLORIDE 40 MG/1
40 CAPSULE ORAL DAILY
Qty: 30 CAPSULE | Refills: 0 | Status: SHIPPED | OUTPATIENT
Start: 2024-07-25

## 2024-07-25 NOTE — TELEPHONE ENCOUNTER
Trazodone just sent over on 7/9, not due for this yet. Medication:   Requested Prescriptions     Pending Prescriptions Disp Refills    ARIPiprazole (ABILIFY) 5 MG tablet 30 tablet 0     Sig: Take 1 tablet by mouth daily    FLUoxetine (PROZAC) 40 MG capsule 30 capsule 0     Sig: Take 1 capsule by mouth daily    levothyroxine (SYNTHROID) 175 MCG tablet 30 tablet 0     Sig: Take 1 tablet by mouth Daily     Refused Prescriptions Disp Refills    traZODone (DESYREL) 100 MG tablet 90 tablet 3     Sig: Take 1 tablet by mouth nightly     Refused By: BIB GENTILE     Reason for Refusal: Refill not appropriate      Last Filled:      Patient Phone Number: [phone] (home)     Last appt: 7/9/2024   Next appt: 8/13/2024    Last OARRS:        No data to display              PDMP Monitoring:    Last PDMP [place] as Reviewed ([place]):  Review User Review Instant Review Result          Preferred Pharmacy:   [facility] #[id number] [place], [place] - [address] [facility] [phone] - F [phone]  [address] [facility] [address]  Phone: [phone] Fax: [phone]

## 2024-07-25 NOTE — TELEPHONE ENCOUNTER
traZODone (DESYREL) 100 MG tablet      levothyroxine (SYNTHROID) 175 MCG tablet     ARIPiprazole (ABILIFY) 5 MG tablet      FLUoxetine (PROZAC) 40 MG capsule     [facility] #[id number] [place] [address] [address] - P [phone] - F [phone] [[id number]]

## 2024-07-30 LAB
ALBUMIN: 4.6 G/DL
ALP BLD-CCNC: 87 U/L
ALT SERPL-CCNC: 33 U/L
ANION GAP SERPL CALCULATED.3IONS-SCNC: 12 MMOL/L
AST SERPL-CCNC: 30 U/L
BILIRUB SERPL-MCNC: 0.5 MG/DL (ref 0.1–1.4)
BUN BLDV-MCNC: 18 MG/DL
CALCIUM SERPL-MCNC: 9.8 MG/DL
CHLORIDE BLD-SCNC: 103 MMOL/L
CHOLESTEROL, TOTAL: 132 MG/DL
CHOLESTEROL/HDL RATIO: NORMAL
CO2: 22 MMOL/L
CREAT SERPL-MCNC: 0.95 MG/DL
ESTIMATED AVERAGE GLUCOSE: NORMAL
GFR, ESTIMATED: 87
GLUCOSE BLD-MCNC: 131 MG/DL
HBA1C MFR BLD: 6.1 %
HDLC SERPL-MCNC: 42 MG/DL (ref 35–70)
LDL CHOLESTEROL: 61
NONHDLC SERPL-MCNC: NORMAL MG/DL
POTASSIUM SERPL-SCNC: 4.2 MMOL/L
SODIUM BLD-SCNC: 141 MMOL/L
TOTAL PROTEIN: 7.6 G/DL (ref 6.4–8.2)
TRIGL SERPL-MCNC: 175 MG/DL
TSH SERPL DL<=0.05 MIU/L-ACNC: 0.64 UIU/ML
VLDLC SERPL CALC-MCNC: 29 MG/DL

## 2024-08-08 DIAGNOSIS — R73.9 HYPERGLYCEMIA: Primary | ICD-10-CM

## 2024-08-12 PROBLEM — M51.369 DEGENERATION OF LUMBAR INTERVERTEBRAL DISC: Status: ACTIVE | Noted: 2024-08-12

## 2024-08-12 PROBLEM — M41.125 ADOLESCENT IDIOPATHIC SCOLIOSIS, THORACOLUMBAR REGION: Status: ACTIVE | Noted: 2024-08-12

## 2024-08-12 PROBLEM — R73.03 PREDIABETES: Status: ACTIVE | Noted: 2024-08-12

## 2024-08-12 PROBLEM — M51.36 DEGENERATION OF LUMBAR INTERVERTEBRAL DISC: Status: ACTIVE | Noted: 2024-08-12

## 2024-08-13 ENCOUNTER — OFFICE VISIT (OUTPATIENT)
Dept: FAMILY MEDICINE CLINIC | Age: 69
End: 2024-08-13
Payer: MEDICARE

## 2024-08-13 VITALS — HEART RATE: 95 BPM | DIASTOLIC BLOOD PRESSURE: 86 MMHG | SYSTOLIC BLOOD PRESSURE: 132 MMHG | OXYGEN SATURATION: 98 %

## 2024-08-13 DIAGNOSIS — E78.2 MIXED HYPERLIPIDEMIA: ICD-10-CM

## 2024-08-13 DIAGNOSIS — M54.50 CHRONIC BILATERAL LOW BACK PAIN WITHOUT SCIATICA: ICD-10-CM

## 2024-08-13 DIAGNOSIS — Z00.00 INITIAL MEDICARE ANNUAL WELLNESS VISIT: Primary | ICD-10-CM

## 2024-08-13 DIAGNOSIS — G89.29 CHRONIC BILATERAL LOW BACK PAIN WITHOUT SCIATICA: ICD-10-CM

## 2024-08-13 DIAGNOSIS — I10 PRIMARY HYPERTENSION: ICD-10-CM

## 2024-08-13 DIAGNOSIS — R73.03 PREDIABETES: ICD-10-CM

## 2024-08-13 DIAGNOSIS — Z12.11 SCREENING FOR COLON CANCER: ICD-10-CM

## 2024-08-13 PROCEDURE — 1036F TOBACCO NON-USER: CPT | Performed by: STUDENT IN AN ORGANIZED HEALTH CARE EDUCATION/TRAINING PROGRAM

## 2024-08-13 PROCEDURE — G0438 PPPS, INITIAL VISIT: HCPCS | Performed by: STUDENT IN AN ORGANIZED HEALTH CARE EDUCATION/TRAINING PROGRAM

## 2024-08-13 PROCEDURE — G8417 CALC BMI ABV UP PARAM F/U: HCPCS | Performed by: STUDENT IN AN ORGANIZED HEALTH CARE EDUCATION/TRAINING PROGRAM

## 2024-08-13 PROCEDURE — 99215 OFFICE O/P EST HI 40 MIN: CPT | Performed by: STUDENT IN AN ORGANIZED HEALTH CARE EDUCATION/TRAINING PROGRAM

## 2024-08-13 PROCEDURE — 3017F COLORECTAL CA SCREEN DOC REV: CPT | Performed by: STUDENT IN AN ORGANIZED HEALTH CARE EDUCATION/TRAINING PROGRAM

## 2024-08-13 PROCEDURE — G8427 DOCREV CUR MEDS BY ELIG CLIN: HCPCS | Performed by: STUDENT IN AN ORGANIZED HEALTH CARE EDUCATION/TRAINING PROGRAM

## 2024-08-13 PROCEDURE — 1123F ACP DISCUSS/DSCN MKR DOCD: CPT | Performed by: STUDENT IN AN ORGANIZED HEALTH CARE EDUCATION/TRAINING PROGRAM

## 2024-08-13 PROCEDURE — 3075F SYST BP GE 130 - 139MM HG: CPT | Performed by: STUDENT IN AN ORGANIZED HEALTH CARE EDUCATION/TRAINING PROGRAM

## 2024-08-13 PROCEDURE — 3079F DIAST BP 80-89 MM HG: CPT | Performed by: STUDENT IN AN ORGANIZED HEALTH CARE EDUCATION/TRAINING PROGRAM

## 2024-08-13 ASSESSMENT — PATIENT HEALTH QUESTIONNAIRE - PHQ9
2. FEELING DOWN, DEPRESSED OR HOPELESS: NOT AT ALL
1. LITTLE INTEREST OR PLEASURE IN DOING THINGS: NOT AT ALL
SUM OF ALL RESPONSES TO PHQ QUESTIONS 1-9: 0
3. TROUBLE FALLING OR STAYING ASLEEP: NOT AT ALL
SUM OF ALL RESPONSES TO PHQ QUESTIONS 1-9: 0
9. THOUGHTS THAT YOU WOULD BE BETTER OFF DEAD, OR OF HURTING YOURSELF: NOT AT ALL
6. FEELING BAD ABOUT YOURSELF - OR THAT YOU ARE A FAILURE OR HAVE LET YOURSELF OR YOUR FAMILY DOWN: NOT AT ALL
10. IF YOU CHECKED OFF ANY PROBLEMS, HOW DIFFICULT HAVE THESE PROBLEMS MADE IT FOR YOU TO DO YOUR WORK, TAKE CARE OF THINGS AT HOME, OR GET ALONG WITH OTHER PEOPLE: NOT DIFFICULT AT ALL
7. TROUBLE CONCENTRATING ON THINGS, SUCH AS READING THE NEWSPAPER OR WATCHING TELEVISION: NOT AT ALL
4. FEELING TIRED OR HAVING LITTLE ENERGY: NOT AT ALL
5. POOR APPETITE OR OVEREATING: NOT AT ALL
SUM OF ALL RESPONSES TO PHQ QUESTIONS 1-9: 0
SUM OF ALL RESPONSES TO PHQ9 QUESTIONS 1 & 2: 0
SUM OF ALL RESPONSES TO PHQ QUESTIONS 1-9: 0

## 2024-08-13 NOTE — PROGRESS NOTES
colon cancer screening. Last had c-scope about 9 years ago after positive FIT test - reports colonoscopy was completely normal. Would like to do Cologuard instead of repeat c-scope.  - Qualifies for AAA screen. Would like to hold off for now.  - Vaccines received today: none. Discussed shingles vaccine, COVID vaccine, tetanus booster, RSV and flu vaccines.   - Depression screen completed, no follow up indicated  - Screening labs already completed - reviewed.    Prediabetes  - New diagnosis - A1C 6.1. Patient already implementing diet changes. Exercise limited secondary to chronic back pain. Discussed utility of starting Metformin. For now will wait and recheck A1C at next office visit. If stable or improved with diet changes, will hold off on medication. If worsening, then may consider adding Metformin.    Mixed hyperlipidemia  - Stable. Continue Lipitor and omega 3 for hyperTG.    Primary hypertension  - Controlled. Much better improved since last office visit. Continue Amlodipine 10 mg and HCTZ 12.5 mg. Will follow up again at next office visit. If BP noted to be persistently high at home in the meantime, can increase dose of HCTZ to 25 mg.    Chronic bilateral low back pain without sciatica  - Continues to follow with pain management. Stable for now.    Screening for colon cancer  -     Fecal DNA Colorectal cancer screening (Cologuard)          Return in about 12 weeks (around 11/5/2024).       *I have spent 45 minutes reviewing previous notes, test results and face to face with the patient discussing the diagnosis and importance of compliance with the treatment plan as well as documenting on the day of the visit.       Note per Fadumo Klein MA and scribe with corrections and edits per Dee Tejeda DO. I agree with the entirety of note and I was present and performed history and physical. I also confirm that the note above accurately reflects all work, treatment, procedures, and medical decision making

## 2024-08-26 RX ORDER — OMEGA-3-ACID ETHYL ESTERS 1 G/1
2 CAPSULE, LIQUID FILLED ORAL 2 TIMES DAILY
Qty: 120 CAPSULE | Refills: 0 | Status: SHIPPED | OUTPATIENT
Start: 2024-08-26

## 2024-08-26 NOTE — TELEPHONE ENCOUNTER
Medication:   Requested Prescriptions     Pending Prescriptions Disp Refills    omega-3 acid ethyl esters (LOVAZA) 1 g capsule 120 capsule 0     Sig: Take 2 capsules by mouth 2 times daily      Last Filled:      Patient Phone Number: [phone] (home)     Last appt: 8/13/2024   Next appt: 11/14/2024    Last OARRS:        No data to display              PDMP Monitoring:    Last PDMP [place] as Reviewed ([place]):  Review User Review Instant Review Result          Preferred Pharmacy:   [facility] #[id number] [place], [place] - [address] - P [phone] - F [phone] [address] [facility] [address]  Phone: [phone] Fax: [phone]

## 2024-08-26 NOTE — TELEPHONE ENCOUNTER
Refill ;    omega-3 acid ethyl esters (LOVAZA) 1 g capsule       [facility] #[id number] - [place], [place] - [address] - P [phone] - F [phone] [address] [facility] [address]  Phone: [phone]  Fax: [phone]       Please advise...

## 2024-08-29 ENCOUNTER — TELEPHONE (OUTPATIENT)
Dept: FAMILY MEDICINE CLINIC | Age: 69
End: 2024-08-29

## 2024-08-29 RX ORDER — LEVOTHYROXINE SODIUM 175 UG/1
175 TABLET ORAL DAILY
Qty: 30 TABLET | Refills: 0 | Status: SHIPPED | OUTPATIENT
Start: 2024-08-29

## 2024-08-29 RX ORDER — FLUOXETINE 40 MG/1
40 CAPSULE ORAL DAILY
Qty: 30 CAPSULE | Refills: 0 | Status: SHIPPED | OUTPATIENT
Start: 2024-08-29

## 2024-08-29 RX ORDER — ARIPIPRAZOLE 5 MG/1
5 TABLET ORAL DAILY
Qty: 30 TABLET | Refills: 0 | Status: SHIPPED | OUTPATIENT
Start: 2024-08-29

## 2024-08-29 NOTE — TELEPHONE ENCOUNTER
Patient is requesting refills     (FLUoxetine (PROZAC) 40 MG capsule  )    ( levothyroxine (SYNTHROID) 175 MCG tablet )       ( ARIPiprazole (ABILIFY) 5 MG tablet )         Pharmacy    [id number] - [place], [place] - [address] - P [phone] - F [phone] [address] [facility] [address]  Phone: [phone]  Fax: [phone]

## 2024-08-29 NOTE — TELEPHONE ENCOUNTER
Medication:   Requested Prescriptions     Pending Prescriptions Disp Refills    FLUoxetine (PROZAC) 40 MG capsule 30 capsule 0     Sig: Take 1 capsule by mouth daily    levothyroxine (SYNTHROID) 175 MCG tablet 30 tablet 0     Sig: Take 1 tablet by mouth Daily    ARIPiprazole (ABILIFY) 5 MG tablet 30 tablet 0     Sig: Take 1 tablet by mouth daily      Last Filled:      Patient Phone Number: [phone] (home)     Last appt: 8/13/2024   Next appt: 11/14/2024    Last OARRS:        No data to display              PDMP Monitoring:    Last PDMP [place] as Reviewed ([place]):  Review User Review Instant Review Result          Preferred Pharmacy:   [facility] #[id number] - [place], [place] - [address] -  [phone] - F [phone]  [address] [facility] [address]  Phone: [phone] Fax: [phone]

## 2024-09-09 ENCOUNTER — TELEPHONE (OUTPATIENT)
Dept: FAMILY MEDICINE CLINIC | Age: 69
End: 2024-09-09

## 2024-09-09 DIAGNOSIS — I10 PRIMARY HYPERTENSION: ICD-10-CM

## 2024-09-09 RX ORDER — HYDROCHLOROTHIAZIDE 12.5 MG/1
12.5 CAPSULE ORAL EVERY MORNING
Qty: 30 CAPSULE | Refills: 1 | Status: SHIPPED | OUTPATIENT
Start: 2024-09-09

## 2024-09-30 DIAGNOSIS — I10 PRIMARY HYPERTENSION: ICD-10-CM

## 2024-09-30 RX ORDER — FLUOXETINE 40 MG/1
40 CAPSULE ORAL DAILY
Qty: 90 CAPSULE | Refills: 1 | Status: SHIPPED | OUTPATIENT
Start: 2024-09-30

## 2024-09-30 RX ORDER — LEVOTHYROXINE SODIUM 175 UG/1
175 TABLET ORAL DAILY
Qty: 90 TABLET | Refills: 1 | Status: SHIPPED | OUTPATIENT
Start: 2024-09-30

## 2024-09-30 RX ORDER — HYDROCHLOROTHIAZIDE 12.5 MG/1
12.5 CAPSULE ORAL EVERY MORNING
Qty: 90 CAPSULE | Refills: 1 | Status: SHIPPED | OUTPATIENT
Start: 2024-09-30

## 2024-09-30 RX ORDER — AMLODIPINE BESYLATE 10 MG/1
10 TABLET ORAL DAILY
Qty: 90 TABLET | Refills: 1 | Status: SHIPPED | OUTPATIENT
Start: 2024-09-30

## 2024-10-07 RX ORDER — ARIPIPRAZOLE 5 MG/1
5 TABLET ORAL DAILY
Qty: 30 TABLET | Refills: 0 | Status: SHIPPED | OUTPATIENT
Start: 2024-10-07

## 2024-10-07 RX ORDER — OMEGA-3-ACID ETHYL ESTERS 1 G/1
2 CAPSULE, LIQUID FILLED ORAL 2 TIMES DAILY
Qty: 120 CAPSULE | Refills: 0 | Status: SHIPPED | OUTPATIENT
Start: 2024-10-07

## 2024-10-07 NOTE — TELEPHONE ENCOUNTER
ARIPiprazole (ABILIFY) 5 MG tablet       omega-3 acid ethyl esters (LOVAZA) 1 g capsule         [facility] [id number] [place] [address] [address] - P [phone] - F [phone]

## 2024-10-07 NOTE — TELEPHONE ENCOUNTER
Medication:   Requested Prescriptions     Pending Prescriptions Disp Refills    ARIPiprazole (ABILIFY) 5 MG tablet 30 tablet 0     Sig: Take 1 tablet by mouth daily    omega-3 acid ethyl esters (LOVAZA) 1 g capsule 120 capsule 0     Sig: Take 2 capsules by mouth 2 times daily      Last Filled:      Patient Phone Number: [phone] (home)     Last appt: Visit date not found   Next appt: Visit date not found    Last OARRS:        No data to display              PDMP Monitoring:    Last PDMP [place] as Reviewed ([place]):  Review User Review Instant Review Result          Preferred Pharmacy:   [facility] #[id number] [place], [place] - [address] [facility] [phone] - F [phone] [address] [facility] [address]  Phone: [phone] Fax: [phone]

## 2024-10-09 ENCOUNTER — TELEPHONE (OUTPATIENT)
Dept: FAMILY MEDICINE CLINIC | Age: 69
End: 2024-10-09

## 2024-10-09 DIAGNOSIS — I10 PRIMARY HYPERTENSION: ICD-10-CM

## 2024-10-09 NOTE — TELEPHONE ENCOUNTER
hydroCHLOROthiazide 12.5 MG capsule [[id number]]     [facility] #[id number] - [place], [place] - [address] - P [phone] - F [phone] [address] [facility] [address]  Phone: [phone]  Fax: [phone]

## 2024-10-16 NOTE — PROGRESS NOTES
PATIENT REACHED   YES____NO__X__    PREOP INSTRUCTIONS LEFT ON VM PHZDIV___[phone]____________      DATE__10/22/24_______ TIME_1030________ARRIVAL_0930_______PLACE_[address]___________  NOTHING TO EAT OR DRINK  AFTER MIDNIGHT THE EVENING PRIOR OR AS INSTRUCTED BY YOUR DR.  YOU NEED A RESPONSIBLE ADULT AGE 18 OR OLDER TO DRIVE YOU HOME  PLEASE BRING INSURANCE CARD.PICTURE ID AND COMPLETE LIST OF MEDS  WEAR LOOSE COMFORTABLE CLOTHING  FOLLOW ANY INSTRUCTIONS YOUR DRS OFFICE HAS GIVEN YOU,INCLUDING WHAT MEDICATIONS TO TAKE THE AM OF PROCEDURE AND WHEN AND IF YOU NEED TO STOP ANY BLOOD THINNERS. IF YOU HAVE QUESTIONS REGARDING THIS CALL THE OFFICE  THE GOAL BLOOD SUGAR THE AM OF PROCEDURE  OR LESS ABOVE THAT THE PROCEDURE MAY BE CANCELLED  ANY QUESTIONS CALL YOUR DOCTOR.ALSO,PLEASE READ THE INSTRUCTION PACKET FROM YOUR DR IF YOU RECEIVED ONE.  SPINE INTERVENTION NUMBER [phone]      OTHER___________________________________      VISITOR POLICY(subject to change)    Current policy is 2 visitors per patient. No children. Masks are required.

## 2024-10-22 ENCOUNTER — APPOINTMENT (OUTPATIENT)
Dept: GENERAL RADIOLOGY | Age: 69
End: 2024-10-22
Attending: ANESTHESIOLOGY
Payer: MEDICARE

## 2024-10-22 ENCOUNTER — HOSPITAL ENCOUNTER (OUTPATIENT)
Age: 69
Setting detail: OUTPATIENT SURGERY
Discharge: HOME | End: 2024-10-22
Attending: ANESTHESIOLOGY | Admitting: ANESTHESIOLOGY
Payer: MEDICARE

## 2024-10-22 VITALS
HEART RATE: 79 BPM | DIASTOLIC BLOOD PRESSURE: 65 MMHG | HEIGHT: 69 IN | OXYGEN SATURATION: 97 % | TEMPERATURE: 96.8 F | WEIGHT: 220 LBS | RESPIRATION RATE: 14 BRPM | BODY MASS INDEX: 32.58 KG/M2 | SYSTOLIC BLOOD PRESSURE: 134 MMHG

## 2024-10-22 PROCEDURE — 3610000054 HC PAIN LEVEL 3 BASE (NON-OR): Performed by: ANESTHESIOLOGY

## 2024-10-22 PROCEDURE — 2500000003 HC RX 250 WO HCPCS: Performed by: ANESTHESIOLOGY

## 2024-10-22 PROCEDURE — 6360000002 HC RX W HCPCS: Performed by: ANESTHESIOLOGY

## 2024-10-22 PROCEDURE — 99152 MOD SED SAME PHYS/QHP 5/>YRS: CPT | Performed by: ANESTHESIOLOGY

## 2024-10-22 PROCEDURE — 2709999900 HC NON-CHARGEABLE SUPPLY: Performed by: ANESTHESIOLOGY

## 2024-10-22 PROCEDURE — 77003 FLUOROGUIDE FOR SPINE INJECT: CPT

## 2024-10-22 RX ORDER — LIDOCAINE HYDROCHLORIDE 10 MG/ML
INJECTION, SOLUTION EPIDURAL; INFILTRATION; INTRACAUDAL; PERINEURAL
Status: COMPLETED | OUTPATIENT
Start: 2024-10-22 | End: 2024-10-22

## 2024-10-22 RX ORDER — MIDAZOLAM HYDROCHLORIDE 1 MG/ML
INJECTION, SOLUTION INTRAMUSCULAR; INTRAVENOUS
Status: COMPLETED | OUTPATIENT
Start: 2024-10-22 | End: 2024-10-22

## 2024-10-22 RX ORDER — METHYLPREDNISOLONE ACETATE 80 MG/ML
INJECTION, SUSPENSION INTRA-ARTICULAR; INTRALESIONAL; INTRAMUSCULAR; SOFT TISSUE
Status: COMPLETED | OUTPATIENT
Start: 2024-10-22 | End: 2024-10-22

## 2024-10-22 RX ORDER — FENTANYL CITRATE 50 UG/ML
INJECTION, SOLUTION INTRAMUSCULAR; INTRAVENOUS
Status: COMPLETED | OUTPATIENT
Start: 2024-10-22 | End: 2024-10-22

## 2024-10-22 ASSESSMENT — PAIN - FUNCTIONAL ASSESSMENT
PAIN_FUNCTIONAL_ASSESSMENT: 0-10

## 2024-10-22 ASSESSMENT — PAIN DESCRIPTION - DESCRIPTORS
DESCRIPTORS: ACHING
DESCRIPTORS: ACHING

## 2024-10-22 NOTE — H&P
Update History & Physical    The patient's History and Physical of September 26, 2024 was reviewed with the patient and I examined the patient. There was no change. The surgical site was confirmed by the patient and me.       Plan: The risks, benefits, expected outcome, and alternative to the recommended procedure have been discussed with the patient. Patient understands and wants to proceed with the procedure.     Electronically signed by MADELAINE GEORGE MD on 10/22/2024 at 9:54 AM

## 2024-10-22 NOTE — BRIEF OP NOTE
Brief Postoperative Note      Patient: River Rodas  YOB: 1955  MRN: [id number]    Date of Procedure: 10/22/2024    Pre-Op Diagnosis Codes:      * Lumbar radiculopathy [M54.16]    Post-Op Diagnosis: Same       Procedure(s):  L4-L5 LUMBAR EPIDURAL STEROID INJECTION WITH FLUOROSCOPY    Surgeon(s):  Madelaine Tovar MD    Assistant:  * No surgical staff found *    Anesthesia: IV Sedation    Estimated Blood Loss (mL): Minimal    Complications: None    Specimens:   * No specimens in log *    Implants:  * No implants in log *      Drains: * No LDAs found *    Findings:  Infection Present At Time Of Surgery (PATOS) (choose all levels that have infection present):  No infection present  Other Findings:     Electronically signed by MADELAINE TOVAR MD on 10/22/2024 at 9:55 AM

## 2024-10-22 NOTE — PROGRESS NOTES
Pt d/c via wheel chair in stable condition to daughter robert to be transported home. Pt reports chronic lower back pain at 3/10. VSS.   Home

## 2024-10-22 NOTE — PROGRESS NOTES
Pt steroid injection complete. Bedside report from jarad luo. Pt awake and alert, able to transfer from table to chair with assist. Pt awake and alert, VSS. Pt denies pain .

## 2024-10-22 NOTE — DISCHARGE INSTRUCTIONS
blood sugar levels in diabetics      12. Please follow up with the provider whom referred you.

## 2024-10-24 NOTE — OP NOTE
Operative Note      Patient: River Rodas  YOB: 1955  MRN: [id number]    Date of Procedure: 10/22/2024    Pre-Op Diagnosis Codes:      * Lumbar radiculopathy [M54.16]    Post-Op Diagnosis: Same       Procedure(s):  L4-L5 LUMBAR EPIDURAL STEROID INJECTION WITH FLUOROSCOPY    Surgeon(s):  Dyana Tovar MD    Assistant:   * No surgical staff found *    Anesthesia: IV Sedation    Estimated Blood Loss (mL): Minimal    Complications: None    Specimens:   * No specimens in log *    Implants:  * No implants in log *      Drains: * No LDAs found *    Findings:  Infection Present At Time Of Surgery (PATOS) (choose all levels that have infection present):  No infection present  Other Findings:     Detailed Description of Procedure:   Procedure in Detail:   The patient's chart was reviewed. After obtaining written informed consent, the patient had a 20 g IV placed, the patient was placed in the prone position with the leg slightly flexed. Versed and Fentanyl was given to the patient intravenous, monitors attached.  Pre-procedure blood pressure and pulse were stable and recorded in patients clinic chart.       MEDICAL NECESSITY: This patient has chronic pain that has failed to respond to conservative measures as outlined in the original history and last physical exam.   The patient's symptoms include Pain and disability of a moderate to severe degree with intermittent refereed pain. The goals of treatment are to 1) achieve optimal pain control, recognizing that a pain-free state may not be achievable; 2) minimize adverse outcomes; 3) enhance functional abilities, and physical and psychological well-being; and 4) enhance the quality of life for patients with chronic pain.  The patient has documented pathology through radiographic imaging, the patient has the same or similar procedure in the past which resulted in significant improvement more than 50% reduction in the pain, as well improvement in their Activity of

## 2024-11-01 RX ORDER — ARIPIPRAZOLE 5 MG/1
5 TABLET ORAL DAILY
Qty: 30 TABLET | Refills: 0 | Status: SHIPPED | OUTPATIENT
Start: 2024-11-01

## 2024-11-01 NOTE — TELEPHONE ENCOUNTER
Medication:   Requested Prescriptions     Pending Prescriptions Disp Refills    ARIPiprazole (ABILIFY) 5 MG tablet 30 tablet 0     Sig: Take 1 tablet by mouth daily      Last Filled:      Patient Phone Number: [phone] (home)     Last appt: Visit date not found   Next appt: Visit date not found    Last OARRS:        No data to display              PDMP Monitoring:    Last PDMP [place] as Reviewed ([place]):  Review User Review Instant Review Result          Preferred Pharmacy:   [facility] #[id number] [place], [place] - [address] - P [phone] - F [phone]  [address] [facility] [address]  Phone: [phone] Fax: [phone]

## 2024-11-01 NOTE — TELEPHONE ENCOUNTER
ARIPiprazole (ABILIFY) 5 MG tablet       [facility] #[id number] - [place] [id number] - P [phone] - F [phone]

## 2024-11-04 DIAGNOSIS — I10 PRIMARY HYPERTENSION: ICD-10-CM

## 2024-11-04 RX ORDER — HYDROCHLOROTHIAZIDE 12.5 MG/1
12.5 CAPSULE ORAL EVERY MORNING
Qty: 90 CAPSULE | Refills: 1 | Status: SHIPPED | OUTPATIENT
Start: 2024-11-04

## 2024-11-04 NOTE — TELEPHONE ENCOUNTER
Medication:   Requested Prescriptions     Pending Prescriptions Disp Refills    hydroCHLOROthiazide 12.5 MG capsule 90 capsule 1     Sig: Take 1 capsule by mouth every morning      Last Filled:      Patient Phone Number: [phone] (home)     Last appt: 8/13/2024   Next appt: 11/14/2024    Last OARRS:        No data to display              PDMP Monitoring:    Last PDMP [place] as Reviewed ([place]):  Review User Review Instant Review Result          Preferred Pharmacy:   [facility] #[id number] [place], [place] - [address] - P [phone] - F [phone] [address] [facility] [address]  Phone: [phone] Fax: [phone]

## 2024-11-04 NOTE — TELEPHONE ENCOUNTER
hydroCHLOROthiazide 12.5 MG capsule    [facility] #[id number] - [place] [address] - P [phone] - F [phone]

## 2024-11-22 ENCOUNTER — OFFICE VISIT (OUTPATIENT)
Dept: FAMILY MEDICINE CLINIC | Age: 69
End: 2024-11-22

## 2024-11-22 VITALS
TEMPERATURE: 99.3 F | BODY MASS INDEX: 29.83 KG/M2 | WEIGHT: 202 LBS | DIASTOLIC BLOOD PRESSURE: 74 MMHG | HEART RATE: 106 BPM | OXYGEN SATURATION: 96 % | SYSTOLIC BLOOD PRESSURE: 128 MMHG

## 2024-11-22 DIAGNOSIS — T40.2X5A OPIOID-INDUCED CONSTIPATION: Primary | ICD-10-CM

## 2024-11-22 DIAGNOSIS — R73.03 PREDIABETES: ICD-10-CM

## 2024-11-22 DIAGNOSIS — Z87.891 FORMER SMOKER: ICD-10-CM

## 2024-11-22 DIAGNOSIS — I48.0 PAROXYSMAL ATRIAL FIBRILLATION (HCC): ICD-10-CM

## 2024-11-22 DIAGNOSIS — Z23 NEED FOR VACCINATION: ICD-10-CM

## 2024-11-22 DIAGNOSIS — K59.03 OPIOID-INDUCED CONSTIPATION: Primary | ICD-10-CM

## 2024-11-22 LAB — HBA1C MFR BLD: 5.2 %

## 2024-11-22 NOTE — PROGRESS NOTES
Office Note  2024  Patient Name: River Rodas  MRN: [id number] : 1955    SUBJECTIVE:     CHIEF COMPLAINT:  Chief Complaint   Patient presents with    Diabetes     Patient states that he has been constipated, he has been on keto and has been even more constipated.        HISTORY OF PRESENT ILLNESS:  He presents today with the following concerns:    Constipation: worse since starting keto diet. Takes amitiza to help with opioid induced constipation. Tried ex-lax last week, gave him diarrhea.     Prediabetes: No medications currently  Hemoglobin A1C   Date Value Ref Range Status   2024 6.1 % Final     HTN:  - Amlodipine 10 mg, HCTZ 12.5 mg  - BP doing much better since increasing dose of Amlodipine    Former smoker - quit 15 years ago. Has never had a AAA ultrasound     Past Medical History:  Past Medical History:   Diagnosis Date    Arthritis     Chronic back pain     Depression     Fibromyalgia     High blood pressure     Hyperlipidemia     Panic attack     Reflux      Past Surgical History:  Past Surgical History:   Procedure Laterality Date    KNEE ARTHROSCOPY  7/16/10    LEFT KNEE    PAIN MANAGEMENT PROCEDURE N/A 3/2/2021    L4L5  INTERLAMINAL EPIDURAL STEROID INJECTION WITH FLUOROSCOPY performed by Dyana Tovar MD at [facility]    PAIN MANAGEMENT PROCEDURE N/A 2021    L5-S1 INTERLAMINAR EPIDURAL STEROID INJECTION WITH FLUOROSCOPY performed by Dyana Tovar MD at [facility]    PAIN MANAGEMENT PROCEDURE N/A 2021    L4L5 EPIDURAL STEROID INJECTION WITH FLUOROSCOPY performed by Dyana Tovar MD at [facility]    PAIN MANAGEMENT PROCEDURE N/A 2021    L4L5 INTERLAMINAR EPIDURAL STEROID INJECTION WITH FLUOROSCOPY performed by Dyana Tovar MD at [facility]    PAIN MANAGEMENT PROCEDURE N/A 3/1/2022    L4-L5 INTERLAMINAR EPIDURAL STEROID INJECTION WITH FLUOROSCOPY performed by Dyana Tovar MD at [facility]    PAIN MANAGEMENT PROCEDURE N/A 5/10/2022    L4-L5 INTERLAMINAR EPIDURAL STEROID INJECTION

## 2024-12-11 RX ORDER — ATORVASTATIN CALCIUM 40 MG/1
40 TABLET, FILM COATED ORAL DAILY
Qty: 90 TABLET | Refills: 1 | Status: SHIPPED | OUTPATIENT
Start: 2024-12-11

## 2024-12-11 RX ORDER — LEVOTHYROXINE SODIUM 175 UG/1
175 TABLET ORAL DAILY
Qty: 90 TABLET | Refills: 1 | Status: SHIPPED | OUTPATIENT
Start: 2024-12-11

## 2024-12-11 RX ORDER — ARIPIPRAZOLE 5 MG/1
5 TABLET ORAL DAILY
Qty: 90 TABLET | Refills: 1 | Status: SHIPPED | OUTPATIENT
Start: 2024-12-11

## 2024-12-23 RX ORDER — OMEGA-3-ACID ETHYL ESTERS 1 G/1
2 CAPSULE, LIQUID FILLED ORAL 2 TIMES DAILY
Qty: 120 CAPSULE | Refills: 0 | Status: SHIPPED | OUTPATIENT
Start: 2024-12-23

## 2024-12-23 NOTE — TELEPHONE ENCOUNTER
Last OV: 11/22/2024  Next OV: 1/3/2025    Refill on omega 3 sent to [facility] [address]    Last fill:10/7/24  Refills:0

## 2024-12-23 NOTE — TELEPHONE ENCOUNTER
Medication:   Requested Prescriptions     Pending Prescriptions Disp Refills    omega-3 acid ethyl esters (LOVAZA) 1 g capsule 120 capsule 0     Sig: Take 2 capsules by mouth 2 times daily      Last Filled:      Patient Phone Number: [phone] (home)     Last appt: 11/22/2024   Next appt: 1/3/2025    Last OARRS:        No data to display              PDMP Monitoring:    Last PDMP [place] as Reviewed ([place]):  Review User Review Instant Review Result          Preferred Pharmacy:   [facility] #[id number] [place], [place] - [address] - P [phone] - F [phone] [address] [facility] [address]  Phone: [phone] Fax: [phone]

## 2024-12-30 DIAGNOSIS — I10 PRIMARY HYPERTENSION: ICD-10-CM

## 2024-12-31 ENCOUNTER — OFFICE VISIT (OUTPATIENT)
Dept: FAMILY MEDICINE CLINIC | Age: 69
End: 2024-12-31
Payer: MEDICARE

## 2024-12-31 DIAGNOSIS — H61.23 BILATERAL IMPACTED CERUMEN: Primary | ICD-10-CM

## 2024-12-31 PROCEDURE — G8428 CUR MEDS NOT DOCUMENT: HCPCS | Performed by: STUDENT IN AN ORGANIZED HEALTH CARE EDUCATION/TRAINING PROGRAM

## 2024-12-31 PROCEDURE — G8417 CALC BMI ABV UP PARAM F/U: HCPCS | Performed by: STUDENT IN AN ORGANIZED HEALTH CARE EDUCATION/TRAINING PROGRAM

## 2024-12-31 PROCEDURE — 69210 REMOVE IMPACTED EAR WAX UNI: CPT | Performed by: STUDENT IN AN ORGANIZED HEALTH CARE EDUCATION/TRAINING PROGRAM

## 2024-12-31 PROCEDURE — 99211 OFF/OP EST MAY X REQ PHY/QHP: CPT | Performed by: STUDENT IN AN ORGANIZED HEALTH CARE EDUCATION/TRAINING PROGRAM

## 2024-12-31 NOTE — PROGRESS NOTES
Ear Irrigation Procedure Note    Ear irrigation procedure was performed using a WaterPik / Elephant ear to the both ear(s) for cerumen impaction.  The remaining wax and debris was removed with curette  instrumentation.  The procedure was successful.  The patient had no complications.    Note per Fadumo Klein MA and scribe with corrections and edits per Dee Tejeda DO. I agree with the entirety of note and I was present and performed history and physical. I also confirm that the note above accurately reflects all work, treatment, procedures, and medical decision making performed by me, Dee Tejeda DO   Scribe attestation

## 2025-01-28 ENCOUNTER — TELEPHONE (OUTPATIENT)
Dept: FAMILY MEDICINE CLINIC | Age: 70
End: 2025-01-28

## 2025-01-28 NOTE — TELEPHONE ENCOUNTER
Patient went to pain clinic today and BP was 173/80. Please give him a call he is concerned to make sure this is an ok level.   [phone]    Please advise.

## 2025-01-30 NOTE — TELEPHONE ENCOUNTER
Any other blood pressures since that reading? Unsure if checking at home.    We would like it lower obviously but that is not horrible, assuming it was transient and is lower now. Can offer appointment if needed.

## 2025-02-18 RX ORDER — OMEGA-3-ACID ETHYL ESTERS 1 G/1
2 CAPSULE, LIQUID FILLED ORAL 2 TIMES DAILY
Qty: 120 CAPSULE | Refills: 0 | Status: SHIPPED | OUTPATIENT
Start: 2025-02-18

## 2025-02-18 NOTE — TELEPHONE ENCOUNTER
Medication:   Requested Prescriptions     Pending Prescriptions Disp Refills    omega-3 acid ethyl esters (LOVAZA) 1 g capsule 120 capsule 0     Sig: Take 2 capsules by mouth 2 times daily      Last Filled:      Patient Phone Number: [phone] (home)     Last appt: 12/31/2024   Next appt: 6/23/2025    Last OARRS:        No data to display              PDMP Monitoring:    Last PDMP [place] as Reviewed ([place]):  Review User Review Instant Review Result          Preferred Pharmacy:   [facility] #[id number] [place], [place] - [address] - P [phone] - F [phone] [address] [facility] [address]  Phone: [phone] Fax: [phone]

## 2025-02-26 ENCOUNTER — TELEPHONE (OUTPATIENT)
Dept: FAMILY MEDICINE CLINIC | Age: 70
End: 2025-02-26

## 2025-02-26 NOTE — TELEPHONE ENCOUNTER
Patient sees Dr. Tovar, he gives him an epidural steroid injection every 3 months.  He is about a month overdue getting this.  Dr. Tovar will not schedule patient in to get this until they get a note from PCP stating you are aware he gets these.      Letter needs to be faxed to [phone] Attn- Josafat

## 2025-03-19 DIAGNOSIS — I10 PRIMARY HYPERTENSION: ICD-10-CM

## 2025-03-19 RX ORDER — AMLODIPINE BESYLATE 10 MG/1
10 TABLET ORAL DAILY
Qty: 90 TABLET | Refills: 1 | Status: SHIPPED | OUTPATIENT
Start: 2025-03-19

## 2025-03-19 RX ORDER — FLUOXETINE HYDROCHLORIDE 40 MG/1
40 CAPSULE ORAL DAILY
Qty: 90 CAPSULE | Refills: 1 | Status: SHIPPED | OUTPATIENT
Start: 2025-03-19

## 2025-03-19 NOTE — TELEPHONE ENCOUNTER
Medication:   Requested Prescriptions     Pending Prescriptions Disp Refills    amLODIPine (NORVASC) 10 MG tablet 90 tablet 1     Sig: Take 1 tablet by mouth daily    FLUoxetine (PROZAC) 40 MG capsule 90 capsule 1     Sig: Take 1 capsule by mouth daily      Provider out of office.     Patient Phone Number: [phone] (home)     Last appt: 12/31/2024   Next appt: 6/23/2025    Last OARRS:        No data to display              PDMP Monitoring:    Last PDMP [place] as Reviewed ([place]):  Review User Review Instant Review Result          Preferred Pharmacy:   [facility] #[id number] [place], [place] - [address] -  [phone] - F [phone] [address] [facility] [address]  Phone: [phone] Fax: [phone]

## 2025-03-19 NOTE — TELEPHONE ENCOUNTER
Pt called in requesting a refill on amLODIPine (NORVASC) 10 MG tablet , FLUoxetine (PROZAC) 40 MG capsule to be sent to [facility] on [address]  Wants to know if he can get a 90 day supply?

## 2025-04-23 RX ORDER — OMEGA-3-ACID ETHYL ESTERS 1 G/1
2 CAPSULE, LIQUID FILLED ORAL 2 TIMES DAILY
Qty: 120 CAPSULE | Refills: 0 | Status: SHIPPED | OUTPATIENT
Start: 2025-04-23

## 2025-04-23 RX ORDER — TRAZODONE HYDROCHLORIDE 100 MG/1
100 TABLET ORAL NIGHTLY
Qty: 90 TABLET | Refills: 3 | Status: SHIPPED | OUTPATIENT
Start: 2025-04-23

## 2025-04-23 NOTE — TELEPHONE ENCOUNTER
Pt called in requesting refills on  omega-3 acid ethyl esters (LOVAZA) 1 g capsule and     traZODone (DESYREL) 100 MG tablet  to be sent to  [facility] #[id number] [facility] [address]

## 2025-05-05 DIAGNOSIS — I10 PRIMARY HYPERTENSION: ICD-10-CM

## 2025-05-05 RX ORDER — HYDROCHLOROTHIAZIDE 12.5 MG/1
12.5 CAPSULE ORAL EVERY MORNING
Qty: 90 CAPSULE | Refills: 0 | Status: SHIPPED | OUTPATIENT
Start: 2025-05-05

## 2025-05-05 NOTE — TELEPHONE ENCOUNTER
Medication:   Requested Prescriptions     Pending Prescriptions Disp Refills    hydroCHLOROthiazide 12.5 MG capsule 90 capsule 1     Sig: Take 1 capsule by mouth every morning      Last Filled:      Patient Phone Number: [phone] (home)     Last appt: 12/31/2024   Next appt: 6/23/2025    Last OARRS:        No data to display              PDMP Monitoring:    Last PDMP [place] as Reviewed ([place]):  Review User Review Instant Review Result          Preferred Pharmacy:   [facility] #[id number] [place], [place] - [address] - P [phone] - F [phone] [address] [facility] [address]  Phone: [phone] Fax: [phone]

## 2025-05-05 NOTE — TELEPHONE ENCOUNTER
Patient calling for a medication refill    hydroCHLOROthiazide 12.5 MG capsule [[id number]]    [facility] #[id number] - [place], [place] - [address] - P [phone] - F [phone] [address] [place] [address]  Phone: [phone]  Fax: [phone]     Please advise

## 2025-05-28 DIAGNOSIS — E78.2 MIXED HYPERLIPIDEMIA: Primary | ICD-10-CM

## 2025-05-28 RX ORDER — ATORVASTATIN CALCIUM 40 MG/1
40 TABLET, FILM COATED ORAL DAILY
Qty: 90 TABLET | Refills: 0 | Status: SHIPPED | OUTPATIENT
Start: 2025-05-28

## 2025-05-28 NOTE — TELEPHONE ENCOUNTER
Pt called in requesting refills on   FLUoxetine (PROZAC) 40 MG capsule    atorvastatin (LIPITOR) 40 MG tablet to be sent to   [facility] #[id number] [place] [address]

## 2025-06-10 ENCOUNTER — TELEPHONE (OUTPATIENT)
Dept: FAMILY MEDICINE CLINIC | Age: 70
End: 2025-06-10

## 2025-06-10 DIAGNOSIS — I10 PRIMARY HYPERTENSION: ICD-10-CM

## 2025-06-10 RX ORDER — LEVOTHYROXINE SODIUM 175 UG/1
175 TABLET ORAL DAILY
Qty: 90 TABLET | Refills: 1 | Status: SHIPPED | OUTPATIENT
Start: 2025-06-10

## 2025-06-10 RX ORDER — ARIPIPRAZOLE 5 MG/1
5 TABLET ORAL DAILY
Qty: 90 TABLET | Refills: 1 | Status: SHIPPED | OUTPATIENT
Start: 2025-06-10

## 2025-06-10 RX ORDER — FLUOXETINE HYDROCHLORIDE 40 MG/1
40 CAPSULE ORAL DAILY
Qty: 90 CAPSULE | Refills: 1 | Status: SHIPPED | OUTPATIENT
Start: 2025-06-10

## 2025-06-10 RX ORDER — AMLODIPINE BESYLATE 10 MG/1
10 TABLET ORAL DAILY
Qty: 90 TABLET | Refills: 1 | Status: SHIPPED | OUTPATIENT
Start: 2025-06-10

## 2025-06-10 NOTE — TELEPHONE ENCOUNTER
Patient called in requesting a 90 day refill for:     amLODIPine (NORVASC) 10 MG tablet [[id number]]     ARIPiprazole (ABILIFY) 5 MG tablet [[id number]]     FLUoxetine (PROZAC) 40 MG capsule [[id number]]     levothyroxine (SYNTHROID) 175 MCG tablet [[id number]]     [facility] #[id number] - [place], [place] - [address] - P [phone] - F [phone] [address] [facility] [address]  Phone: [phone]  Fax: [phone]

## 2025-06-22 PROBLEM — E03.9 ACQUIRED HYPOTHYROIDISM: Status: ACTIVE | Noted: 2025-06-22

## 2025-06-23 ENCOUNTER — OFFICE VISIT (OUTPATIENT)
Dept: FAMILY MEDICINE CLINIC | Age: 70
End: 2025-06-23
Payer: MEDICARE

## 2025-06-23 VITALS
WEIGHT: 218 LBS | HEART RATE: 103 BPM | RESPIRATION RATE: 16 BRPM | TEMPERATURE: 98.7 F | OXYGEN SATURATION: 96 % | DIASTOLIC BLOOD PRESSURE: 78 MMHG | SYSTOLIC BLOOD PRESSURE: 132 MMHG | BODY MASS INDEX: 32.19 KG/M2

## 2025-06-23 DIAGNOSIS — E03.9 ACQUIRED HYPOTHYROIDISM: ICD-10-CM

## 2025-06-23 DIAGNOSIS — G89.29 CHRONIC BILATERAL LOW BACK PAIN WITH BILATERAL SCIATICA: ICD-10-CM

## 2025-06-23 DIAGNOSIS — E78.2 MIXED HYPERLIPIDEMIA: ICD-10-CM

## 2025-06-23 DIAGNOSIS — G62.9 NEUROPATHY: ICD-10-CM

## 2025-06-23 DIAGNOSIS — Z00.00 MEDICARE ANNUAL WELLNESS VISIT, SUBSEQUENT: Primary | ICD-10-CM

## 2025-06-23 DIAGNOSIS — I48.0 PAROXYSMAL ATRIAL FIBRILLATION (HCC): ICD-10-CM

## 2025-06-23 DIAGNOSIS — Z53.20 COLON CANCER SCREENING DECLINED: ICD-10-CM

## 2025-06-23 DIAGNOSIS — Z12.5 SCREENING FOR PROSTATE CANCER: ICD-10-CM

## 2025-06-23 DIAGNOSIS — M54.42 CHRONIC BILATERAL LOW BACK PAIN WITH BILATERAL SCIATICA: ICD-10-CM

## 2025-06-23 DIAGNOSIS — I10 PRIMARY HYPERTENSION: ICD-10-CM

## 2025-06-23 DIAGNOSIS — M54.41 CHRONIC BILATERAL LOW BACK PAIN WITH BILATERAL SCIATICA: ICD-10-CM

## 2025-06-23 DIAGNOSIS — R73.03 PREDIABETES: ICD-10-CM

## 2025-06-23 PROCEDURE — G8427 DOCREV CUR MEDS BY ELIG CLIN: HCPCS | Performed by: STUDENT IN AN ORGANIZED HEALTH CARE EDUCATION/TRAINING PROGRAM

## 2025-06-23 PROCEDURE — G8417 CALC BMI ABV UP PARAM F/U: HCPCS | Performed by: STUDENT IN AN ORGANIZED HEALTH CARE EDUCATION/TRAINING PROGRAM

## 2025-06-23 PROCEDURE — 3075F SYST BP GE 130 - 139MM HG: CPT | Performed by: STUDENT IN AN ORGANIZED HEALTH CARE EDUCATION/TRAINING PROGRAM

## 2025-06-23 PROCEDURE — 1123F ACP DISCUSS/DSCN MKR DOCD: CPT | Performed by: STUDENT IN AN ORGANIZED HEALTH CARE EDUCATION/TRAINING PROGRAM

## 2025-06-23 PROCEDURE — G2211 COMPLEX E/M VISIT ADD ON: HCPCS | Performed by: STUDENT IN AN ORGANIZED HEALTH CARE EDUCATION/TRAINING PROGRAM

## 2025-06-23 PROCEDURE — 3017F COLORECTAL CA SCREEN DOC REV: CPT | Performed by: STUDENT IN AN ORGANIZED HEALTH CARE EDUCATION/TRAINING PROGRAM

## 2025-06-23 PROCEDURE — 3078F DIAST BP <80 MM HG: CPT | Performed by: STUDENT IN AN ORGANIZED HEALTH CARE EDUCATION/TRAINING PROGRAM

## 2025-06-23 PROCEDURE — 1036F TOBACCO NON-USER: CPT | Performed by: STUDENT IN AN ORGANIZED HEALTH CARE EDUCATION/TRAINING PROGRAM

## 2025-06-23 PROCEDURE — G0439 PPPS, SUBSEQ VISIT: HCPCS | Performed by: STUDENT IN AN ORGANIZED HEALTH CARE EDUCATION/TRAINING PROGRAM

## 2025-06-23 PROCEDURE — 1159F MED LIST DOCD IN RCRD: CPT | Performed by: STUDENT IN AN ORGANIZED HEALTH CARE EDUCATION/TRAINING PROGRAM

## 2025-06-23 PROCEDURE — 99214 OFFICE O/P EST MOD 30 MIN: CPT | Performed by: STUDENT IN AN ORGANIZED HEALTH CARE EDUCATION/TRAINING PROGRAM

## 2025-06-23 RX ORDER — OMEGA-3-ACID ETHYL ESTERS 1 G/1
2 CAPSULE, LIQUID FILLED ORAL 2 TIMES DAILY
Qty: 120 CAPSULE | Refills: 0 | Status: SHIPPED | OUTPATIENT
Start: 2025-06-23

## 2025-06-23 SDOH — ECONOMIC STABILITY: FOOD INSECURITY: WITHIN THE PAST 12 MONTHS, YOU WORRIED THAT YOUR FOOD WOULD RUN OUT BEFORE YOU GOT MONEY TO BUY MORE.: NEVER TRUE

## 2025-06-23 SDOH — ECONOMIC STABILITY: FOOD INSECURITY: WITHIN THE PAST 12 MONTHS, THE FOOD YOU BOUGHT JUST DIDN'T LAST AND YOU DIDN'T HAVE MONEY TO GET MORE.: NEVER TRUE

## 2025-06-23 ASSESSMENT — PATIENT HEALTH QUESTIONNAIRE - PHQ9
SUM OF ALL RESPONSES TO PHQ QUESTIONS 1-9: 7
SUM OF ALL RESPONSES TO PHQ QUESTIONS 1-9: 7
9. THOUGHTS THAT YOU WOULD BE BETTER OFF DEAD, OR OF HURTING YOURSELF: NOT AT ALL
6. FEELING BAD ABOUT YOURSELF - OR THAT YOU ARE A FAILURE OR HAVE LET YOURSELF OR YOUR FAMILY DOWN: NOT AT ALL
2. FEELING DOWN, DEPRESSED OR HOPELESS: SEVERAL DAYS
8. MOVING OR SPEAKING SO SLOWLY THAT OTHER PEOPLE COULD HAVE NOTICED. OR THE OPPOSITE, BEING SO FIGETY OR RESTLESS THAT YOU HAVE BEEN MOVING AROUND A LOT MORE THAN USUAL: NOT AT ALL
SUM OF ALL RESPONSES TO PHQ QUESTIONS 1-9: 7
SUM OF ALL RESPONSES TO PHQ QUESTIONS 1-9: 7
3. TROUBLE FALLING OR STAYING ASLEEP: NEARLY EVERY DAY
7. TROUBLE CONCENTRATING ON THINGS, SUCH AS READING THE NEWSPAPER OR WATCHING TELEVISION: NOT AT ALL
10. IF YOU CHECKED OFF ANY PROBLEMS, HOW DIFFICULT HAVE THESE PROBLEMS MADE IT FOR YOU TO DO YOUR WORK, TAKE CARE OF THINGS AT HOME, OR GET ALONG WITH OTHER PEOPLE: NOT DIFFICULT AT ALL
4. FEELING TIRED OR HAVING LITTLE ENERGY: MORE THAN HALF THE DAYS
1. LITTLE INTEREST OR PLEASURE IN DOING THINGS: SEVERAL DAYS
5. POOR APPETITE OR OVEREATING: NOT AT ALL

## 2025-06-23 NOTE — PROGRESS NOTES
MEDICARE ANNUAL WELLNESS VISIT    Patient is here for their Medicare Annual Wellness Visit    Last eye exam: 10 years ago   Last dental exam:10yrs ago  Exercise:  never,   Do you eat balanced/healthy meals regularly? Yes    How would you rate your overall health? : Good            6/23/2025    12:45 PM 8/13/2024    12:41 PM 7/9/2024    12:37 PM   Fall Risk   Do you feel unsteady or are you worried about falling?  yes yes yes   2 or more falls in past year? no no no   Fall with injury in past year? no no no           6/23/2025    12:46 PM 8/13/2024    12:41 PM 7/9/2024    12:37 PM   PHQ Scores   PHQ2 Score 2 0 0   PHQ9 Score 7 0 0         Do you always wear a seat belt in the car?: No       Have you noted any problems with hearing?: No  Have you noted any vision problems?: No  Do you have concerns about your sexual health including any concerns about having an STD or would you like to be tested for an STD?: no  In the past month how much has pain been an issue for you?:  Quite a bit  In the past month have you had issues with anxiety, loneliness, irritability or fatigue:  Not at all    Do you take opioid medications even sometimes? Yes: Morphine Tablets for back pain ,  using assess risk and whether other treatments would be beneficial)    Living Will and/or Healthcare POA: No,   Patient declined      Healthcare Decision Maker:    Primary Decision Maker: Marva Stewart - Child - [phone]           Who lives at home with you: Daughter, Grandchildren and Son inlaw   Do you have any pets? none  Do you have any services coming to your home (meals on wheels, home health, etc) ?: no      Do you need help with:  Using the phone:  No  Bathing: No  Dressing:  No  Toileting: No  Transportation:  No  Shopping: No  Preparing meals: No  Housework/Laundry: No  Medications: No  Money management: No    Does your home have:  Unsecured throw rugs: No  Grab bars in bathroom: Yes  Walk in shower: No  Seat in shower: No  Lit pathways 
Office Note  2025  Patient Name: River Rodas  MRN: [id number] : 1955    SUBJECTIVE:     CHIEF COMPLAINT:  Chief Complaint   Patient presents with    Medicare AWV     Wants to discuss leg pain        HISTORY OF PRESENT ILLNESS:  He presents today with the following concerns:  History of Present Illness  The patient presents for evaluation of neuropathy, hypertension, atrial fibrillation, and health maintenance.    Neuropathy  - Reports balance issues due to neuropathy in his legs, limiting his ability to walk long distances, with a burning sensation.  - Has been on pregabalin 100 mg twice daily for several years for back pain but to his knowledge has not tried gabapentin.  - Under pain management care and has consulted neurosurgeons and orthopedic surgeons, but surgery is too risky.  - Will see his pain management specialist on Thursday.    Hypertension  - On amlodipine and hydrochlorothiazide.    Atrial Fibrillation  - Has a history of atrial fibrillation, previously managed by a cardiologist.  - Was hospitalized and took medication for 3 months but discontinued due to financial constraints.  - Has not had further issues.    HLD:  Lab Results   Component Value Date    CHOL 132 2024    TRIG 175 2024    HDL 42 2024    LDL 61 2024    VLDL 29 2024     Prediabetes:  Hemoglobin A1C   Date Value Ref Range Status   2024 5.2 % Final      Hypothyroidism:  Lab Results   Component Value Date    TSH 0.642 2024        Supplemental information: He requests a refill of his omega-3 supplement and recently refilled other medications. He declined colon cancer screening.       Results  Labs  - Blood sugar levels: 2024, Normal  - Cholesterol levels: , Normal     Past Medical History:  Past Medical History:   Diagnosis Date    Arthritis     Chronic back pain     Depression     Fibromyalgia     High blood pressure     Hyperlipidemia     Panic attack     Reflux      Past 
the phone:  {NO/YES:19833}  Bathing: {NO/YES:19833}  Dressing:  {NO/YES:19833}  Toileting: {NO/YES:19833}  Transportation:  {NO/YES:19833}  Shopping: {NO/YES:19833}  Preparing meals: {NO/YES:19833}  Housework/Laundry: {NO/YES:19833}  Medications: {NO/YES:19833}  Money management: {NO/YES:19833}    Does your home have:  Unsecured throw rugs: {NO/YES:19833}  Grab bars in bathroom: {YES / NO:19727}  Walk in shower: {YES / NO:19727}  Seat in shower: {YES / NO:19727}  Lit pathways for night (nightlights): {YES / NO:19727}  NBO TV Alert System: {YES NO:49351}      Memory:  Have you or anyone close to you expressed concerns about your memory: {NO/YES:19833}    Knows:  Month: {YES / NO:19727}  Day: {YES / NO:19727}  Year: {YES / NO:19727}  Day of Week: {YES / NO:19727}  Able to Recall (telephone, bicycle, chair) : {YES / NO:19727}      Care Team:  Patient's list of care team members was updated in EHR under the Snap Shot.     Patient Care Team:  Dee Tejeda DO as PCP - General (Family Medicine)  Dee eTjeda DO as PCP - Empaneled Provider    Dentist:   Eye:       Patient history:   Patient's medications, allergies, past medical, surgical, social and family histories were reviewed and updated in the EHR under History.         Immunizations: Reviewed with patient.     Health Maintenance Due   Topic Date Due    DTaP/Tdap/Td vaccine (1 - Tdap) Never done    Colorectal Cancer Screen  Never done    Shingles vaccine (1 of 2) Never done    Pneumococcal 50+ years Vaccine (1 of 1 - PCV) Never done    AAA screen  Never done    COVID-19 Vaccine (4 - 2024-25 season) 09/01/2024    Annual Wellness Visit (Medicare Advantage)  01/01/2025       CHRONIC CONDITIONS / ACUTE COMPLAINTS ***          Physical Exam:    There is no height or weight on file to calculate BMI.  There were no vitals filed for this visit.  Wt Readings from Last 3 Encounters:   11/22/24 91.6 kg (202 lb)   10/22/24 99.8 kg (220 lb)   07/09/24 96.4 kg (212 lb 9.6

## 2025-07-01 ENCOUNTER — TRANSCRIBE ORDERS (OUTPATIENT)
Dept: ADMINISTRATIVE | Age: 70
End: 2025-07-01

## 2025-07-01 DIAGNOSIS — M54.16 LUMBAR RADICULOPATHY: Primary | ICD-10-CM

## 2025-07-14 DIAGNOSIS — I10 PRIMARY HYPERTENSION: ICD-10-CM

## 2025-07-14 DIAGNOSIS — Z12.5 SCREENING FOR PROSTATE CANCER: ICD-10-CM

## 2025-07-14 DIAGNOSIS — E03.9 ACQUIRED HYPOTHYROIDISM: ICD-10-CM

## 2025-07-14 DIAGNOSIS — R73.03 PREDIABETES: ICD-10-CM

## 2025-07-14 DIAGNOSIS — E78.2 MIXED HYPERLIPIDEMIA: ICD-10-CM

## 2025-07-14 LAB
ALBUMIN SERPL-MCNC: 4.5 G/DL (ref 3.4–5)
ALBUMIN/GLOB SERPL: 1.5 {RATIO} (ref 1.1–2.2)
ALP SERPL-CCNC: 87 U/L (ref 40–129)
ALT SERPL-CCNC: 30 U/L (ref 10–40)
ANION GAP SERPL CALCULATED.3IONS-SCNC: 13 MMOL/L (ref 3–16)
AST SERPL-CCNC: 29 U/L (ref 15–37)
BILIRUB SERPL-MCNC: 0.7 MG/DL (ref 0–1)
BUN SERPL-MCNC: 16 MG/DL (ref 7–20)
CALCIUM SERPL-MCNC: 9.8 MG/DL (ref 8.3–10.6)
CHLORIDE SERPL-SCNC: 105 MMOL/L (ref 99–110)
CHOLEST SERPL-MCNC: 123 MG/DL (ref 0–199)
CO2 SERPL-SCNC: 26 MMOL/L (ref 21–32)
CREAT SERPL-MCNC: 0.8 MG/DL (ref 0.8–1.3)
DEPRECATED RDW RBC AUTO: 12.7 % (ref 12.4–15.4)
EST. AVERAGE GLUCOSE BLD GHB EST-MCNC: 105.4 MG/DL
GFR SERPLBLD CREATININE-BSD FMLA CKD-EPI: >90 ML/MIN/{1.73_M2}
GLUCOSE SERPL-MCNC: 129 MG/DL (ref 70–99)
HBA1C MFR BLD: 5.3 %
HCT VFR BLD AUTO: 40.9 % (ref 40.5–52.5)
HDLC SERPL-MCNC: 45 MG/DL (ref 40–60)
HGB BLD-MCNC: 14.3 G/DL (ref 13.5–17.5)
LDL CHOLESTEROL: 57 MG/DL
MCH RBC QN AUTO: 32.6 PG (ref 26–34)
MCHC RBC AUTO-ENTMCNC: 35.1 G/DL (ref 31–36)
MCV RBC AUTO: 93 FL (ref 80–100)
PLATELET # BLD AUTO: 259 K/UL (ref 135–450)
PMV BLD AUTO: 8.1 FL (ref 5–10.5)
POTASSIUM SERPL-SCNC: 4 MMOL/L (ref 3.5–5.1)
PROT SERPL-MCNC: 7.5 G/DL (ref 6.4–8.2)
PSA SERPL DL<=0.01 NG/ML-MCNC: 0.38 NG/ML (ref 0–4)
RBC # BLD AUTO: 4.39 M/UL (ref 4.2–5.9)
SODIUM SERPL-SCNC: 144 MMOL/L (ref 136–145)
TRIGL SERPL-MCNC: 107 MG/DL (ref 0–150)
TSH SERPL DL<=0.005 MIU/L-ACNC: 2.02 UIU/ML (ref 0.27–4.2)
VLDLC SERPL CALC-MCNC: 21 MG/DL
WBC # BLD AUTO: 11.5 K/UL (ref 4–11)

## 2025-07-17 ENCOUNTER — HOSPITAL ENCOUNTER (OUTPATIENT)
Dept: CT IMAGING | Age: 70
Discharge: HOME | End: 2025-07-17
Payer: MEDICARE

## 2025-07-17 DIAGNOSIS — M54.16 LUMBAR RADICULOPATHY: ICD-10-CM

## 2025-07-17 PROCEDURE — 72131 CT LUMBAR SPINE W/O DYE: CPT

## 2025-07-18 RX ORDER — DOXYCYCLINE HYCLATE 100 MG
100 TABLET ORAL 2 TIMES DAILY
Qty: 10 TABLET | Refills: 0 | Status: SHIPPED | OUTPATIENT
Start: 2025-07-18 | End: 2025-07-23

## 2025-07-18 NOTE — TELEPHONE ENCOUNTER
Patient Prescribed  Doxycycline 100mg Tablet, 1tab 2QD for 5days for Sinus Infection Per Dr Tejeda.

## 2025-07-30 RX ORDER — TRAZODONE HYDROCHLORIDE 100 MG/1
100 TABLET ORAL NIGHTLY
Qty: 90 TABLET | Refills: 3 | Status: SHIPPED | OUTPATIENT
Start: 2025-07-30

## 2025-07-30 NOTE — TELEPHONE ENCOUNTER
traZODone (DESYREL) 100 MG tablet     [facility] #[id number] - [place], [place] - [address] - P [phone] - F [phone] [address] [facility] [address]  Phone: [phone]  Fax: [phone]

## 2025-08-01 ENCOUNTER — TELEPHONE (OUTPATIENT)
Dept: FAMILY MEDICINE CLINIC | Age: 70
End: 2025-08-01

## 2025-08-01 DIAGNOSIS — I10 PRIMARY HYPERTENSION: ICD-10-CM

## 2025-08-01 RX ORDER — HYDROCHLOROTHIAZIDE 12.5 MG/1
12.5 CAPSULE ORAL EVERY MORNING
Qty: 30 CAPSULE | Refills: 4 | Status: SHIPPED | OUTPATIENT
Start: 2025-08-01

## 2025-08-01 NOTE — TELEPHONE ENCOUNTER
hydroCHLOROthiazide 12.5 MG capsule      90 day supply    [facility] #[id number] - [place], [place] - [address] - P [phone] - F [phone] [address] [facility] [address]  Phone: [phone]  Fax: [phone]

## 2025-08-02 DIAGNOSIS — E78.2 MIXED HYPERLIPIDEMIA: ICD-10-CM

## 2025-08-05 RX ORDER — OMEGA-3-ACID ETHYL ESTERS 1 G/1
2 CAPSULE, LIQUID FILLED ORAL 2 TIMES DAILY
Qty: 120 CAPSULE | Refills: 0 | Status: SHIPPED | OUTPATIENT
Start: 2025-08-05

## 2025-08-26 ENCOUNTER — APPOINTMENT (OUTPATIENT)
Dept: GENERAL RADIOLOGY | Age: 70
End: 2025-08-26
Attending: ANESTHESIOLOGY
Payer: MEDICARE

## 2025-08-26 ENCOUNTER — HOSPITAL ENCOUNTER (OUTPATIENT)
Age: 70
Setting detail: OUTPATIENT SURGERY
Discharge: HOME | End: 2025-08-26
Attending: ANESTHESIOLOGY | Admitting: ANESTHESIOLOGY
Payer: MEDICARE

## 2025-08-26 VITALS
RESPIRATION RATE: 18 BRPM | WEIGHT: 208 LBS | DIASTOLIC BLOOD PRESSURE: 70 MMHG | OXYGEN SATURATION: 100 % | SYSTOLIC BLOOD PRESSURE: 144 MMHG | HEIGHT: 69 IN | TEMPERATURE: 96.5 F | HEART RATE: 72 BPM | BODY MASS INDEX: 30.81 KG/M2

## 2025-08-26 PROCEDURE — 2709999900 HC NON-CHARGEABLE SUPPLY: Performed by: ANESTHESIOLOGY

## 2025-08-26 PROCEDURE — 77003 FLUOROGUIDE FOR SPINE INJECT: CPT

## 2025-08-26 PROCEDURE — 99152 MOD SED SAME PHYS/QHP 5/>YRS: CPT | Performed by: ANESTHESIOLOGY

## 2025-08-26 PROCEDURE — 3610000054 HC PAIN LEVEL 3 BASE (NON-OR): Performed by: ANESTHESIOLOGY

## 2025-08-26 PROCEDURE — 6360000004 HC RX CONTRAST MEDICATION: Performed by: ANESTHESIOLOGY

## 2025-08-26 PROCEDURE — 6360000002 HC RX W HCPCS: Performed by: ANESTHESIOLOGY

## 2025-08-26 RX ORDER — IOPAMIDOL 612 MG/ML
INJECTION, SOLUTION INTRATHECAL
Status: DISCONTINUED | OUTPATIENT
Start: 2025-08-26 | End: 2025-08-26 | Stop reason: ALTCHOICE

## 2025-08-26 RX ORDER — DEXAMETHASONE SODIUM PHOSPHATE 10 MG/ML
INJECTION, SOLUTION INTRAMUSCULAR; INTRAVENOUS
Status: DISCONTINUED | OUTPATIENT
Start: 2025-08-26 | End: 2025-08-26 | Stop reason: ALTCHOICE

## 2025-08-26 RX ORDER — MIDAZOLAM HYDROCHLORIDE 1 MG/ML
INJECTION, SOLUTION INTRAMUSCULAR; INTRAVENOUS
Status: DISCONTINUED | OUTPATIENT
Start: 2025-08-26 | End: 2025-08-26 | Stop reason: ALTCHOICE

## 2025-08-26 RX ORDER — LIDOCAINE HYDROCHLORIDE 10 MG/ML
INJECTION, SOLUTION EPIDURAL; INFILTRATION; INTRACAUDAL; PERINEURAL
Status: DISCONTINUED | OUTPATIENT
Start: 2025-08-26 | End: 2025-08-26 | Stop reason: ALTCHOICE

## 2025-08-26 ASSESSMENT — PAIN SCALES - GENERAL
PAINLEVEL_OUTOF10: 0

## 2025-08-26 ASSESSMENT — PAIN - FUNCTIONAL ASSESSMENT: PAIN_FUNCTIONAL_ASSESSMENT: 0-10

## 2025-08-28 DIAGNOSIS — E78.2 MIXED HYPERLIPIDEMIA: ICD-10-CM

## 2025-08-28 RX ORDER — LEVOTHYROXINE SODIUM 175 UG/1
175 TABLET ORAL DAILY
Qty: 90 TABLET | Refills: 1 | Status: SHIPPED | OUTPATIENT
Start: 2025-08-28

## 2025-08-28 RX ORDER — ARIPIPRAZOLE 5 MG/1
5 TABLET ORAL DAILY
Qty: 90 TABLET | Refills: 1 | Status: SHIPPED | OUTPATIENT
Start: 2025-08-28

## 2025-08-28 RX ORDER — ATORVASTATIN CALCIUM 40 MG/1
40 TABLET, FILM COATED ORAL DAILY
Qty: 90 TABLET | Refills: 0 | Status: SHIPPED | OUTPATIENT
Start: 2025-08-28

## 2025-09-02 DIAGNOSIS — I10 PRIMARY HYPERTENSION: ICD-10-CM

## 2025-09-02 DIAGNOSIS — E78.2 MIXED HYPERLIPIDEMIA: ICD-10-CM

## 2025-09-02 RX ORDER — OMEGA-3-ACID ETHYL ESTERS 1 G/1
2 CAPSULE, LIQUID FILLED ORAL 2 TIMES DAILY
Qty: 360 CAPSULE | Refills: 1 | Status: SHIPPED | OUTPATIENT
Start: 2025-09-02

## 2025-09-02 RX ORDER — HYDROCHLOROTHIAZIDE 12.5 MG/1
12.5 CAPSULE ORAL EVERY MORNING
Qty: 90 CAPSULE | Refills: 1 | Status: SHIPPED | OUTPATIENT
Start: 2025-09-02

## (undated) DEVICE — MEDIA CONTRAST RX ISOVUE-300 61% 30ML VIALS

## (undated) DEVICE — 6 ML SYRINGE LUER-LOCK TIP: Brand: MONOJECT

## (undated) DEVICE — NEEDLE SPNL 22GA L3.5IN BLK HUB S STL REG WALL FIT STYL

## (undated) DEVICE — 7496-8Z MINI-PLUS KIT: Brand: DEVON

## (undated) DEVICE — PEN: MARKING STD 100/CS: Brand: MEDICAL ACTION INDUSTRIES

## (undated) DEVICE — PORT VLV 2 W NDL FREE SMRTSITE

## (undated) DEVICE — APPLICATOR MEDICATED 3 CC SOLUTION CLR STRL CHLORAPREP

## (undated) DEVICE — NEEDLE SPNL 22GA L3.5IN BLK HUB S STL REG WALL FIT STYL W/

## (undated) DEVICE — GLOVE ORANGE PI 7 1/2   MSG9075

## (undated) DEVICE — 3 ML SYRINGE LUER-LOCK TIP: Brand: MONOJECT

## (undated) DEVICE — TRAY ANES EPI 5CC GLS LL 18GA CUST

## (undated) DEVICE — NEEDLE SPNL WEISS LNG 18 GAX5 IN MOD TUOHY PT TW PERISAFE

## (undated) DEVICE — STERILE POLYISOPRENE POWDER-FREE SURGICAL GLOVES: Brand: PROTEXIS

## (undated) DEVICE — Device: Brand: JELCO

## (undated) DEVICE — STANDARD HYPODERMIC NEEDLE,POLYPROPYLENE HUB: Brand: MONOJECT

## (undated) DEVICE — STERILE LATEX POWDER-FREE SURGICAL GLOVESWITH NITRILE COATING: Brand: PROTEXIS

## (undated) DEVICE — SYRINGE FLSH 6ML BOLD GRAD 0.2ML LUERLOCK TIP ULT SHRP TRI

## (undated) DEVICE — PEN SURG MARKING 9.5X12.75X5.5 IN STD PT REG TIP 9 LBL DEVON

## (undated) DEVICE — Device

## (undated) DEVICE — SYRINGE MED 3ML HYPO NDL BOLD GRAD 0.1ML STD LUERLOCK TIP

## (undated) DEVICE — GLOVE SURG SZ 8.5 L11.8IN FNGR THK9.1MIL CUF THK6.7MIL CRM